# Patient Record
Sex: MALE | Race: WHITE | Employment: FULL TIME | URBAN - METROPOLITAN AREA
[De-identification: names, ages, dates, MRNs, and addresses within clinical notes are randomized per-mention and may not be internally consistent; named-entity substitution may affect disease eponyms.]

---

## 2017-01-24 ENCOUNTER — LAB ENCOUNTER (OUTPATIENT)
Dept: LAB | Age: 57
End: 2017-01-24
Attending: INTERNAL MEDICINE
Payer: COMMERCIAL

## 2017-01-24 DIAGNOSIS — E78.00 PURE HYPERCHOLESTEROLEMIA: ICD-10-CM

## 2017-01-24 DIAGNOSIS — F41.9 ANXIETY: ICD-10-CM

## 2017-01-24 LAB
ALBUMIN SERPL-MCNC: 4.1 G/DL (ref 3.5–4.8)
ALP LIVER SERPL-CCNC: 73 U/L (ref 45–117)
ALT SERPL-CCNC: 49 U/L (ref 17–63)
AST SERPL-CCNC: 18 U/L (ref 15–41)
BASOPHILS # BLD AUTO: 0.05 X10(3) UL (ref 0–0.1)
BASOPHILS NFR BLD AUTO: 0.9 %
BILIRUB SERPL-MCNC: 1 MG/DL (ref 0.1–2)
BILIRUB UR QL STRIP.AUTO: NEGATIVE
BUN BLD-MCNC: 10 MG/DL (ref 8–20)
CALCIUM BLD-MCNC: 9 MG/DL (ref 8.3–10.3)
CHLORIDE: 108 MMOL/L (ref 101–111)
CLARITY UR REFRACT.AUTO: CLEAR
CO2: 25 MMOL/L (ref 22–32)
COLOR UR AUTO: YELLOW
CREAT BLD-MCNC: 0.89 MG/DL (ref 0.7–1.3)
EOSINOPHIL # BLD AUTO: 0.1 X10(3) UL (ref 0–0.3)
EOSINOPHIL NFR BLD AUTO: 1.8 %
ERYTHROCYTE [DISTWIDTH] IN BLOOD BY AUTOMATED COUNT: 12.8 % (ref 11.5–16)
GLUCOSE BLD-MCNC: 102 MG/DL (ref 70–99)
GLUCOSE UR STRIP.AUTO-MCNC: NEGATIVE MG/DL
HCT VFR BLD AUTO: 41.7 % (ref 37–53)
HGB BLD-MCNC: 14.1 G/DL (ref 13–17)
IMMATURE GRANULOCYTE COUNT: 0.03 X10(3) UL (ref 0–1)
IMMATURE GRANULOCYTE RATIO %: 0.5 %
KETONES UR STRIP.AUTO-MCNC: NEGATIVE MG/DL
LEUKOCYTE ESTERASE UR QL STRIP.AUTO: NEGATIVE
LYMPHOCYTES # BLD AUTO: 1.9 X10(3) UL (ref 0.9–4)
LYMPHOCYTES NFR BLD AUTO: 33.5 %
M PROTEIN MFR SERPL ELPH: 7 G/DL (ref 6.1–8.3)
MCH RBC QN AUTO: 30.3 PG (ref 27–33.2)
MCHC RBC AUTO-ENTMCNC: 33.8 G/DL (ref 31–37)
MCV RBC AUTO: 89.5 FL (ref 80–99)
MONOCYTES # BLD AUTO: 0.34 X10(3) UL (ref 0.1–0.6)
MONOCYTES NFR BLD AUTO: 6 %
NEUTROPHIL ABS PRELIM: 3.26 X10 (3) UL (ref 1.3–6.7)
NEUTROPHILS # BLD AUTO: 3.26 X10(3) UL (ref 1.3–6.7)
NEUTROPHILS NFR BLD AUTO: 57.3 %
NITRITE UR QL STRIP.AUTO: NEGATIVE
PH UR STRIP.AUTO: 5 [PH] (ref 4.5–8)
PLATELET # BLD AUTO: 195 10(3)UL (ref 150–450)
POTASSIUM SERPL-SCNC: 4.1 MMOL/L (ref 3.6–5.1)
PROT UR STRIP.AUTO-MCNC: NEGATIVE MG/DL
PSA SERPL-MCNC: 0.71 NG/ML (ref 0.01–4)
RBC # BLD AUTO: 4.66 X10(6)UL (ref 4.3–5.7)
RBC UR QL AUTO: NEGATIVE
RED CELL DISTRIBUTION WIDTH-SD: 41.5 FL (ref 35.1–46.3)
SODIUM SERPL-SCNC: 142 MMOL/L (ref 136–144)
SP GR UR STRIP.AUTO: 1.02 (ref 1–1.03)
TSI SER-ACNC: 3.33 MIU/ML (ref 0.35–5.5)
UROBILINOGEN UR STRIP.AUTO-MCNC: <2 MG/DL
WBC # BLD AUTO: 5.7 X10(3) UL (ref 4–13)

## 2017-01-24 PROCEDURE — 81003 URINALYSIS AUTO W/O SCOPE: CPT

## 2017-01-24 PROCEDURE — 81291 MTHFR GENE: CPT

## 2017-01-24 PROCEDURE — 84153 ASSAY OF PSA TOTAL: CPT

## 2017-01-24 PROCEDURE — 85025 COMPLETE CBC W/AUTO DIFF WBC: CPT

## 2017-01-24 PROCEDURE — 36415 COLL VENOUS BLD VENIPUNCTURE: CPT

## 2017-01-24 PROCEDURE — 80053 COMPREHEN METABOLIC PANEL: CPT

## 2017-01-24 PROCEDURE — 84443 ASSAY THYROID STIM HORMONE: CPT

## 2017-02-08 NOTE — TELEPHONE ENCOUNTER
Requesting Escitalopram   LOV: 10/13/16  RTC: 2 weeks   Last Labs: n/a   Filled: 1/25/16 #90 with 3 refills    Future Appointments  Date Time Provider Savannah Strong   3/8/2017 10:00 AM Jhonny Ro MD [facility] [address] [facility] [address]       Pended if okay

## 2017-02-09 RX ORDER — ESCITALOPRAM OXALATE 10 MG/1
TABLET ORAL
Qty: 90 TABLET | Refills: 0 | Status: SHIPPED | OUTPATIENT
Start: 2017-02-09 | End: 2017-03-08

## 2017-03-01 ENCOUNTER — MED REC SCAN ONLY (OUTPATIENT)
Dept: FAMILY MEDICINE CLINIC | Facility: CLINIC | Age: 57
End: 2017-03-01

## 2017-03-08 ENCOUNTER — LAB ENCOUNTER (OUTPATIENT)
Dept: LAB | Age: 57
End: 2017-03-08
Attending: INTERNAL MEDICINE
Payer: COMMERCIAL

## 2017-03-08 ENCOUNTER — OFFICE VISIT (OUTPATIENT)
Dept: FAMILY MEDICINE CLINIC | Facility: CLINIC | Age: 57
End: 2017-03-08

## 2017-03-08 VITALS
WEIGHT: 253 LBS | TEMPERATURE: 98 F | HEART RATE: 82 BPM | DIASTOLIC BLOOD PRESSURE: 84 MMHG | RESPIRATION RATE: 16 BRPM | HEIGHT: 73 IN | BODY MASS INDEX: 33.53 KG/M2 | SYSTOLIC BLOOD PRESSURE: 124 MMHG

## 2017-03-08 DIAGNOSIS — E78.00 PURE HYPERCHOLESTEROLEMIA: Primary | ICD-10-CM

## 2017-03-08 DIAGNOSIS — Z15.89 HETEROZYGOUS MTHFR MUTATION C677T: Primary | ICD-10-CM

## 2017-03-08 DIAGNOSIS — R73.03 PREDIABETES: ICD-10-CM

## 2017-03-08 DIAGNOSIS — F41.9 ANXIETY: ICD-10-CM

## 2017-03-08 PROCEDURE — 99213 OFFICE O/P EST LOW 20 MIN: CPT | Performed by: INTERNAL MEDICINE

## 2017-03-08 RX ORDER — ESCITALOPRAM OXALATE 10 MG/1
10 TABLET ORAL
Qty: 90 TABLET | Refills: 3 | Status: SHIPPED | OUTPATIENT
Start: 2017-03-08 | End: 2020-03-02 | Stop reason: ALTCHOICE

## 2017-03-08 RX ORDER — LEVOMEFOLATE CALCIUM 15 MG
15 TABLET ORAL DAILY
Qty: 30 TABLET | Refills: 11 | Status: SHIPPED | COMMUNITY
Start: 2017-03-08 | End: 2017-04-07

## 2017-03-08 NOTE — PROGRESS NOTES
CC: Patient presents with:  Lab Results: pt declines flu shot today       HPI:   1. Anxiety, doing well on lexapro, needs refill     Also here to discuss labs.        Current Outpatient Prescriptions:  L-Methylfolate 15 MG Oral Tab Take 1 tablet (15 m Wt 253 lb  BMI 33.39 kg/m2  GENERAL: well nourished and in no apparent distress, A/O x3  HEENT:  PERRLA  LUNGS: clear to auscultation bilat   CARDIO: RRR without murmur      Orders Placed This Encounter  Flu Fluarix QUADrivalent >=3 yrs SINGLE dose Preserv

## 2017-03-16 ENCOUNTER — TELEPHONE (OUTPATIENT)
Dept: FAMILY MEDICINE CLINIC | Facility: CLINIC | Age: 57
End: 2017-03-16

## 2017-03-20 NOTE — TELEPHONE ENCOUNTER
Elevated lipids. OV in 2-3 months. Total Chol - 249  Trig - 571  Future Appointments  Date Time Provider Savannah Ligia   9/11/2017 9:00 AM Chio Crowley MD [facility] [address] [facility] [address]     Copy mailed to patient.

## 2017-03-22 NOTE — TELEPHONE ENCOUNTER
Patient notified of lab results. There are no available appointments in 3 months. Patient informed will place on waiting list for an appointment.

## 2017-09-11 ENCOUNTER — OFFICE VISIT (OUTPATIENT)
Dept: FAMILY MEDICINE CLINIC | Facility: CLINIC | Age: 57
End: 2017-09-11

## 2017-09-11 VITALS
SYSTOLIC BLOOD PRESSURE: 135 MMHG | DIASTOLIC BLOOD PRESSURE: 85 MMHG | WEIGHT: 253 LBS | HEIGHT: 73 IN | BODY MASS INDEX: 33.53 KG/M2 | RESPIRATION RATE: 16 BRPM | HEART RATE: 96 BPM

## 2017-09-11 DIAGNOSIS — Z12.5 SCREENING PSA (PROSTATE SPECIFIC ANTIGEN): ICD-10-CM

## 2017-09-11 DIAGNOSIS — Z15.89 HETEROZYGOUS MTHFR MUTATION C677T: ICD-10-CM

## 2017-09-11 DIAGNOSIS — F41.9 ANXIETY: ICD-10-CM

## 2017-09-11 DIAGNOSIS — E78.00 PURE HYPERCHOLESTEROLEMIA: Primary | ICD-10-CM

## 2017-09-11 PROCEDURE — 99214 OFFICE O/P EST MOD 30 MIN: CPT | Performed by: INTERNAL MEDICINE

## 2017-09-11 RX ORDER — LEVOMEFOLATE CALCIUM 15 MG
TABLET ORAL
Refills: 11 | COMMUNITY
Start: 2017-07-05 | End: 2018-01-15

## 2017-09-11 RX ORDER — ROSUVASTATIN CALCIUM 20 MG/1
20 TABLET, COATED ORAL NIGHTLY
Qty: 90 TABLET | Refills: 3 | Status: SHIPPED | OUTPATIENT
Start: 2017-09-11

## 2017-09-11 NOTE — PROGRESS NOTES
CC: Patient presents with:  Lab Results  Medication Follow-Up       HPI:   1. Pure hypercholesterolemia  (primary encounter diagnosis), been trying to watch diet, with family hx of heart dz  2.  [facility]ozygous [facility] doing well on L-m Anemia     Heterozygous MTHFR mutation C677T ([email or web address]        REVIEW OF SYSTEMS:   GENERAL HEALTH: feels well otherwise, denied any fevers chills or night sweats   RESPIRATORY: denies shortness of breath   CARDIOVASCULAR: denies chest pain    EXAM:   /85

## 2017-12-27 ENCOUNTER — LAB ENCOUNTER (OUTPATIENT)
Dept: LAB | Age: 57
End: 2017-12-27
Attending: INTERNAL MEDICINE
Payer: COMMERCIAL

## 2017-12-27 DIAGNOSIS — R73.09 ELEVATED GLUCOSE: ICD-10-CM

## 2017-12-27 DIAGNOSIS — Z12.5 SCREENING PSA (PROSTATE SPECIFIC ANTIGEN): ICD-10-CM

## 2017-12-27 DIAGNOSIS — E78.00 PURE HYPERCHOLESTEROLEMIA: ICD-10-CM

## 2017-12-27 PROCEDURE — 83036 HEMOGLOBIN GLYCOSYLATED A1C: CPT | Performed by: FAMILY MEDICINE

## 2018-03-28 RX ORDER — ESCITALOPRAM OXALATE 10 MG/1
TABLET ORAL
Qty: 90 TABLET | Refills: 0 | OUTPATIENT
Start: 2018-03-28

## 2018-04-16 PROBLEM — K60.2 FISSURE IN ANO: Status: ACTIVE | Noted: 2018-04-16

## 2023-06-16 ENCOUNTER — OFFICE VISIT (OUTPATIENT)
Dept: FAMILY MEDICINE CLINIC | Facility: CLINIC | Age: 63
End: 2023-06-16
Payer: COMMERCIAL

## 2023-06-16 VITALS
RESPIRATION RATE: 16 BRPM | HEART RATE: 82 BPM | BODY MASS INDEX: 31.95 KG/M2 | WEIGHT: 249 LBS | DIASTOLIC BLOOD PRESSURE: 74 MMHG | SYSTOLIC BLOOD PRESSURE: 144 MMHG | HEIGHT: 74 IN | OXYGEN SATURATION: 97 %

## 2023-06-16 DIAGNOSIS — I10 PRIMARY HYPERTENSION: Primary | ICD-10-CM

## 2023-06-16 DIAGNOSIS — Z00.00 LABORATORY EXAMINATION ORDERED AS PART OF A ROUTINE GENERAL MEDICAL EXAMINATION: ICD-10-CM

## 2023-06-16 DIAGNOSIS — Z12.11 SCREENING FOR COLON CANCER: ICD-10-CM

## 2023-06-16 DIAGNOSIS — R53.83 OTHER FATIGUE: ICD-10-CM

## 2023-06-16 DIAGNOSIS — E78.00 PURE HYPERCHOLESTEROLEMIA: ICD-10-CM

## 2023-06-16 DIAGNOSIS — Z12.5 ENCOUNTER FOR PROSTATE CANCER SCREENING: ICD-10-CM

## 2023-06-16 DIAGNOSIS — Z87.898 HISTORY OF PREDIABETES: ICD-10-CM

## 2023-06-16 DIAGNOSIS — K21.9 GASTROESOPHAGEAL REFLUX DISEASE, UNSPECIFIED WHETHER ESOPHAGITIS PRESENT: ICD-10-CM

## 2023-06-16 DIAGNOSIS — Z23 NEED FOR SHINGLES VACCINE: ICD-10-CM

## 2023-06-16 PROCEDURE — 3077F SYST BP >= 140 MM HG: CPT | Performed by: FAMILY MEDICINE

## 2023-06-16 PROCEDURE — 90750 HZV VACC RECOMBINANT IM: CPT | Performed by: FAMILY MEDICINE

## 2023-06-16 PROCEDURE — 3008F BODY MASS INDEX DOCD: CPT | Performed by: FAMILY MEDICINE

## 2023-06-16 PROCEDURE — 90471 IMMUNIZATION ADMIN: CPT | Performed by: FAMILY MEDICINE

## 2023-06-16 PROCEDURE — 3078F DIAST BP <80 MM HG: CPT | Performed by: FAMILY MEDICINE

## 2023-06-16 PROCEDURE — 99203 OFFICE O/P NEW LOW 30 MIN: CPT | Performed by: FAMILY MEDICINE

## 2023-06-16 RX ORDER — OMEPRAZOLE 40 MG/1
40 CAPSULE, DELAYED RELEASE ORAL DAILY
Qty: 30 CAPSULE | Refills: 5 | Status: SHIPPED | OUTPATIENT
Start: 2023-06-16 | End: 2023-06-20

## 2023-06-16 RX ORDER — IBUPROFEN 200 MG
400 TABLET ORAL EVERY 6 HOURS PRN
COMMUNITY
End: 2023-06-20

## 2023-06-20 ENCOUNTER — TELEPHONE (OUTPATIENT)
Dept: FAMILY MEDICINE CLINIC | Facility: CLINIC | Age: 63
End: 2023-06-20

## 2023-06-20 RX ORDER — ASPIRIN 81 MG/1
81 TABLET ORAL DAILY
Refills: 0 | COMMUNITY
Start: 2023-06-20

## 2023-06-20 RX ORDER — ATORVASTATIN CALCIUM 80 MG/1
80 TABLET, FILM COATED ORAL NIGHTLY
Refills: 0 | COMMUNITY
Start: 2023-06-20

## 2023-06-20 RX ORDER — LOSARTAN POTASSIUM 50 MG/1
50 TABLET ORAL DAILY
Refills: 0 | COMMUNITY
Start: 2023-06-20

## 2023-06-20 RX ORDER — METOPROLOL TARTRATE 50 MG/1
50 TABLET, FILM COATED ORAL 2 TIMES DAILY
Refills: 0 | COMMUNITY
Start: 2023-06-20

## 2023-06-20 NOTE — TELEPHONE ENCOUNTER
PSR: Please message triage when wife calls back. Called pt's wife and left vm to call office back. Office number provided.    LOV: 06/16/23 as NP

## 2023-06-20 NOTE — TELEPHONE ENCOUNTER
Patient's wife states that the patient had a heart attack on 6/17. Was sent via ambulance to [facility]. Had two stents placed by Dr. Carline Garcia on 6/17/2023. Then had one stent placed yesterday by Dr. Carline Garcia. Total of three stents. Discharged today on the following medications: ASA, atorvastatin, losartan, metoprolol, and Brilinta. (Medication list updated.) Scheduled to see cardiologist, Dr. Carline Garcia, on 6/23/2023. Received 30 day supplies of the five medications when discharged. Patient's wife states that the patient needs a sooner appointment with PCP. (Currently scheduled to see PCP on 7/17/2023.) Additionally, states that PCP will need to manage prescriptions from hospital. Advised patient's wife that cardiologist should manage current medications. Advised patient's wife to address medications with cardiologist on Friday. Patient's wife verbalized understanding. Patient discharged today from hospital post heart attack and stent placement. HFU scheduled 7/17/2023. Requesting sooner appointment. Please advise.

## 2023-06-20 NOTE — TELEPHONE ENCOUNTER
Pt was released from Hospital ( [facility]) today from having a heart attack, pt is out of all his meds and was told he needs to follow up with his pcp. Pt already has appt for 7/17 but he needs to be seen sooner. Pt wife does have medical records ready to bring to appt as well.     LOV:6/16/23

## 2023-06-21 NOTE — TELEPHONE ENCOUNTER
PSR: Please call pt's wife to double book HFU for pt this or next week per Dr. Eldon Babb. Thank you.

## 2023-06-27 ENCOUNTER — LAB ENCOUNTER (OUTPATIENT)
Dept: LAB | Age: 63
End: 2023-06-27
Attending: FAMILY MEDICINE
Payer: COMMERCIAL

## 2023-06-27 DIAGNOSIS — Z87.898 HISTORY OF PREDIABETES: ICD-10-CM

## 2023-06-27 DIAGNOSIS — R53.83 OTHER FATIGUE: ICD-10-CM

## 2023-06-27 DIAGNOSIS — Z00.00 LABORATORY EXAMINATION ORDERED AS PART OF A ROUTINE GENERAL MEDICAL EXAMINATION: ICD-10-CM

## 2023-06-27 DIAGNOSIS — Z12.5 ENCOUNTER FOR PROSTATE CANCER SCREENING: ICD-10-CM

## 2023-06-27 LAB
ALBUMIN SERPL-MCNC: 4.2 G/DL (ref 3.4–5)
ALBUMIN/GLOB SERPL: 1.3 {RATIO} (ref 1–2)
ALP LIVER SERPL-CCNC: 77 U/L
ALT SERPL-CCNC: 49 U/L
ANION GAP SERPL CALC-SCNC: 7 MMOL/L (ref 0–18)
AST SERPL-CCNC: 26 U/L (ref 15–37)
BASOPHILS # BLD AUTO: 0.06 X10(3) UL (ref 0–0.2)
BASOPHILS NFR BLD AUTO: 1 %
BILIRUB SERPL-MCNC: 1.4 MG/DL (ref 0.1–2)
BUN BLD-MCNC: 15 MG/DL (ref 7–18)
CALCIUM BLD-MCNC: 9.7 MG/DL (ref 8.5–10.1)
CHLORIDE SERPL-SCNC: 107 MMOL/L (ref 98–112)
CHOLEST SERPL-MCNC: 85 MG/DL (ref ?–200)
CO2 SERPL-SCNC: 25 MMOL/L (ref 21–32)
COMPLEXED PSA SERPL-MCNC: 0.57 NG/ML (ref ?–4)
CREAT BLD-MCNC: 1.04 MG/DL
EOSINOPHIL # BLD AUTO: 0.17 X10(3) UL (ref 0–0.7)
EOSINOPHIL NFR BLD AUTO: 2.9 %
ERYTHROCYTE [DISTWIDTH] IN BLOOD BY AUTOMATED COUNT: 12.3 %
FASTING PATIENT LIPID ANSWER: YES
FASTING STATUS PATIENT QL REPORTED: YES
GFR SERPLBLD BASED ON 1.73 SQ M-ARVRAT: 82 ML/MIN/1.73M2 (ref 60–?)
GLOBULIN PLAS-MCNC: 3.3 G/DL (ref 2.8–4.4)
GLUCOSE BLD-MCNC: 121 MG/DL (ref 70–99)
HCT VFR BLD AUTO: 43.1 %
HDLC SERPL-MCNC: 44 MG/DL (ref 40–59)
HGB BLD-MCNC: 14 G/DL
IMM GRANULOCYTES # BLD AUTO: 0.03 X10(3) UL (ref 0–1)
IMM GRANULOCYTES NFR BLD: 0.5 %
LDLC SERPL CALC-MCNC: 21 MG/DL (ref ?–100)
LYMPHOCYTES # BLD AUTO: 1.67 X10(3) UL (ref 1–4)
LYMPHOCYTES NFR BLD AUTO: 28.4 %
MCH RBC QN AUTO: 30.3 PG (ref 26–34)
MCHC RBC AUTO-ENTMCNC: 32.5 G/DL (ref 31–37)
MCV RBC AUTO: 93.3 FL
MONOCYTES # BLD AUTO: 0.46 X10(3) UL (ref 0.1–1)
MONOCYTES NFR BLD AUTO: 7.8 %
NEUTROPHILS # BLD AUTO: 3.5 X10 (3) UL (ref 1.5–7.7)
NEUTROPHILS # BLD AUTO: 3.5 X10(3) UL (ref 1.5–7.7)
NEUTROPHILS NFR BLD AUTO: 59.4 %
NONHDLC SERPL-MCNC: 41 MG/DL (ref ?–130)
OSMOLALITY SERPL CALC.SUM OF ELEC: 290 MOSM/KG (ref 275–295)
PLATELET # BLD AUTO: 238 10(3)UL (ref 150–450)
POTASSIUM SERPL-SCNC: 4.3 MMOL/L (ref 3.5–5.1)
PROT SERPL-MCNC: 7.5 G/DL (ref 6.4–8.2)
RBC # BLD AUTO: 4.62 X10(6)UL
SODIUM SERPL-SCNC: 139 MMOL/L (ref 136–145)
TRIGL SERPL-MCNC: 111 MG/DL (ref 30–149)
TSI SER-ACNC: 2.48 MIU/ML (ref 0.36–3.74)
VIT B12 SERPL-MCNC: 647 PG/ML (ref 193–986)
VLDLC SERPL CALC-MCNC: 14 MG/DL (ref 0–30)
WBC # BLD AUTO: 5.9 X10(3) UL (ref 4–11)

## 2023-06-27 PROCEDURE — 82607 VITAMIN B-12: CPT

## 2023-06-27 PROCEDURE — 82306 VITAMIN D 25 HYDROXY: CPT

## 2023-06-27 PROCEDURE — 80061 LIPID PANEL: CPT

## 2023-06-27 PROCEDURE — 84443 ASSAY THYROID STIM HORMONE: CPT

## 2023-06-27 PROCEDURE — 83036 HEMOGLOBIN GLYCOSYLATED A1C: CPT

## 2023-06-27 PROCEDURE — 36415 COLL VENOUS BLD VENIPUNCTURE: CPT

## 2023-06-27 PROCEDURE — 80053 COMPREHEN METABOLIC PANEL: CPT

## 2023-06-27 PROCEDURE — 85025 COMPLETE CBC W/AUTO DIFF WBC: CPT

## 2023-06-28 LAB — VIT D+METAB SERPL-MCNC: 42.4 NG/ML (ref 30–100)

## 2023-06-29 ENCOUNTER — OFFICE VISIT (OUTPATIENT)
Dept: FAMILY MEDICINE CLINIC | Facility: CLINIC | Age: 63
End: 2023-06-29
Payer: COMMERCIAL

## 2023-06-29 VITALS
SYSTOLIC BLOOD PRESSURE: 108 MMHG | OXYGEN SATURATION: 98 % | HEIGHT: 74 IN | WEIGHT: 235 LBS | RESPIRATION RATE: 16 BRPM | HEART RATE: 66 BPM | DIASTOLIC BLOOD PRESSURE: 70 MMHG | BODY MASS INDEX: 30.16 KG/M2

## 2023-06-29 DIAGNOSIS — I25.10 CORONARY ARTERY DISEASE INVOLVING NATIVE CORONARY ARTERY OF NATIVE HEART WITHOUT ANGINA PECTORIS: Primary | ICD-10-CM

## 2023-06-29 DIAGNOSIS — R73.9 HYPERGLYCEMIA: ICD-10-CM

## 2023-06-29 DIAGNOSIS — I10 PRIMARY HYPERTENSION: ICD-10-CM

## 2023-06-29 LAB
EST. AVERAGE GLUCOSE BLD GHB EST-MCNC: 117 MG/DL (ref 68–126)
HBA1C MFR BLD: 5.7 % (ref ?–5.7)

## 2023-06-29 PROCEDURE — 3074F SYST BP LT 130 MM HG: CPT | Performed by: FAMILY MEDICINE

## 2023-06-29 PROCEDURE — 99213 OFFICE O/P EST LOW 20 MIN: CPT | Performed by: FAMILY MEDICINE

## 2023-06-29 PROCEDURE — 3078F DIAST BP <80 MM HG: CPT | Performed by: FAMILY MEDICINE

## 2023-06-29 PROCEDURE — 3008F BODY MASS INDEX DOCD: CPT | Performed by: FAMILY MEDICINE

## 2023-06-29 RX ORDER — CLOPIDOGREL BISULFATE 75 MG/1
75 TABLET ORAL DAILY
COMMUNITY
Start: 2023-06-23

## 2023-07-13 ENCOUNTER — TELEPHONE (OUTPATIENT)
Dept: FAMILY MEDICINE CLINIC | Facility: CLINIC | Age: 63
End: 2023-07-13

## 2023-07-13 NOTE — TELEPHONE ENCOUNTER
Consult report dated 7/12/2023 was received from [facility]. Repot placed in Dr Darron Bowden in-box pending review.

## 2023-09-15 ENCOUNTER — LAB ENCOUNTER (OUTPATIENT)
Dept: LAB | Age: 63
End: 2023-09-15
Attending: FAMILY MEDICINE
Payer: COMMERCIAL

## 2023-09-15 DIAGNOSIS — R73.9 HYPERGLYCEMIA: ICD-10-CM

## 2023-09-15 LAB
EST. AVERAGE GLUCOSE BLD GHB EST-MCNC: 117 MG/DL (ref 68–126)
HBA1C MFR BLD: 5.7 % (ref ?–5.7)

## 2023-09-15 PROCEDURE — 83036 HEMOGLOBIN GLYCOSYLATED A1C: CPT | Performed by: FAMILY MEDICINE

## 2023-10-02 ENCOUNTER — NURSE ONLY (OUTPATIENT)
Dept: FAMILY MEDICINE CLINIC | Facility: CLINIC | Age: 63
End: 2023-10-02
Payer: COMMERCIAL

## 2023-10-02 DIAGNOSIS — Z23 NEED FOR SHINGLES VACCINE: Primary | ICD-10-CM

## 2023-10-02 PROCEDURE — 90750 HZV VACC RECOMBINANT IM: CPT | Performed by: FAMILY MEDICINE

## 2023-10-02 PROCEDURE — 90471 IMMUNIZATION ADMIN: CPT | Performed by: FAMILY MEDICINE

## 2023-10-05 ENCOUNTER — PATIENT MESSAGE (OUTPATIENT)
Dept: FAMILY MEDICINE CLINIC | Facility: CLINIC | Age: 63
End: 2023-10-05

## 2023-10-05 NOTE — TELEPHONE ENCOUNTER
From: Brandon Anderson  To: Shelby Giles  Sent: 10/5/2023 1:04 PM CDT  Subject: Blood work    Do you want me to get blood work done before next visit. If so can you put order in. It will almost be 2 months before I had it done before our next visit.

## 2023-10-11 ENCOUNTER — OFFICE VISIT (OUTPATIENT)
Dept: FAMILY MEDICINE CLINIC | Facility: CLINIC | Age: 63
End: 2023-10-11
Payer: COMMERCIAL

## 2023-10-11 VITALS
DIASTOLIC BLOOD PRESSURE: 66 MMHG | BODY MASS INDEX: 27.21 KG/M2 | OXYGEN SATURATION: 97 % | RESPIRATION RATE: 16 BRPM | HEIGHT: 74 IN | HEART RATE: 68 BPM | SYSTOLIC BLOOD PRESSURE: 106 MMHG | WEIGHT: 212 LBS

## 2023-10-11 DIAGNOSIS — Z23 NEED FOR VACCINATION: ICD-10-CM

## 2023-10-11 DIAGNOSIS — I10 PRIMARY HYPERTENSION: ICD-10-CM

## 2023-10-11 DIAGNOSIS — E78.00 PURE HYPERCHOLESTEROLEMIA: ICD-10-CM

## 2023-10-11 DIAGNOSIS — I25.10 CORONARY ARTERY DISEASE INVOLVING NATIVE CORONARY ARTERY OF NATIVE HEART WITHOUT ANGINA PECTORIS: Primary | ICD-10-CM

## 2023-10-11 PROCEDURE — 3008F BODY MASS INDEX DOCD: CPT | Performed by: FAMILY MEDICINE

## 2023-10-11 PROCEDURE — 3074F SYST BP LT 130 MM HG: CPT | Performed by: FAMILY MEDICINE

## 2023-10-11 PROCEDURE — 99213 OFFICE O/P EST LOW 20 MIN: CPT | Performed by: FAMILY MEDICINE

## 2023-10-11 PROCEDURE — 90471 IMMUNIZATION ADMIN: CPT | Performed by: FAMILY MEDICINE

## 2023-10-11 PROCEDURE — 3078F DIAST BP <80 MM HG: CPT | Performed by: FAMILY MEDICINE

## 2023-10-11 PROCEDURE — 90686 IIV4 VACC NO PRSV 0.5 ML IM: CPT | Performed by: FAMILY MEDICINE

## 2023-10-11 RX ORDER — LOSARTAN POTASSIUM 25 MG/1
25 TABLET ORAL DAILY
COMMUNITY
Start: 2023-09-22

## 2023-10-11 RX ORDER — ATORVASTATIN CALCIUM 20 MG/1
20 TABLET, FILM COATED ORAL DAILY
COMMUNITY
Start: 2023-09-20

## 2024-03-04 ENCOUNTER — TELEMEDICINE (OUTPATIENT)
Dept: FAMILY MEDICINE CLINIC | Facility: CLINIC | Age: 64
End: 2024-03-04
Payer: COMMERCIAL

## 2024-03-04 ENCOUNTER — TELEPHONE (OUTPATIENT)
Dept: FAMILY MEDICINE CLINIC | Facility: CLINIC | Age: 64
End: 2024-03-04

## 2024-03-04 ENCOUNTER — PATIENT MESSAGE (OUTPATIENT)
Dept: FAMILY MEDICINE CLINIC | Facility: CLINIC | Age: 64
End: 2024-03-04

## 2024-03-04 DIAGNOSIS — M54.9 UPPER BACK PAIN: Primary | ICD-10-CM

## 2024-03-04 DIAGNOSIS — G47.00 INSOMNIA, UNSPECIFIED TYPE: ICD-10-CM

## 2024-03-04 RX ORDER — DIAZEPAM 5 MG/1
5 TABLET ORAL EVERY 12 HOURS PRN
Qty: 60 TABLET | Refills: 0 | Status: SHIPPED | OUTPATIENT
Start: 2024-03-04

## 2024-03-04 NOTE — TELEPHONE ENCOUNTER
Flag on file, Jose called to verify prescription for   diazePAM 5 MG Oral Tab [[id number]]      [id number] - [place], [place] - [address] AT [facility] [address], [phone], [phone]

## 2024-03-04 NOTE — TELEPHONE ENCOUNTER
From: Sanket Anderson  To: Campbell Giles  Sent: 3/4/2024 3:47 PM CST  Subject: Jose verification     Dr Chan Garcia won’t fill prescription until you verify.

## 2024-03-04 NOTE — TELEPHONE ENCOUNTER
Called Jose, talked with Pharmacist Portia who is verifying if you sent diazepam 5 mg. Please advise. Thank you.

## 2024-03-04 NOTE — PROGRESS NOTES
Virtual Telephone Check-In    Sanket Anderson verbally consents to a Virtual/Telephone Check-In visit on 03/04/24.  Patient has been referred to the [facility] website at [email or web address] to review the yearly Consent to Treat document.    Patient understands and accepts financial responsibility for any deductible, co-insurance and/or co-pays associated with this service.    Duration of the service: 22 minutes      Summary of topics discussed:     HPI:    He was recently switched to pravastatin. He reports recent upper back pain. He did mention this to his cardiologist - he was advised to stop pravastatin and complete CK levels. He has history of fibromyalgia and was previously on many medications for this. He feels this is similar to previous fibromyalgia pain  Causes him difficulty following asleep. He is only able to sleep for a few hours d/t pain. He is requesting medication to help with insomnia. He denies any associated numbness, weakness. Denies any injury. Has been doing stretches which has been helping. Sx during day are manageable, worsens at night. He has been traveling.    He has been trying heat, topical analgesics.       ROS: pertinent positives and negatives as per HPI     PE:   Gen: AAOx3  Head: NC/AT  Eyes: conjunctiva normal  Nose: No discharge visualized   Resp: breathing well on exam, normal effort. Able to speak full sentences without any SOB  Skin: no rashes, no lesions, no cyanosis  Psych: normal affect       Diagnoses and all orders for this visit:    Upper back pain  -     diazePAM 5 MG Oral Tab; Take 1 tablet (5 mg total) by mouth every 12 (twelve) hours as needed for Anxiety or Sleep.    Insomnia, unspecified type      Will trial diazepam - could help as muscle relaxant and anti-anxyiolytic/sleep aid. Advised that he needs follow up OV in clinic for further assessment - he agreed and will scheduled. Reviewed medication administration, side effects. Continue tylenol prn. Continue  heat/ice topical pain analgesics, stretching exercises. F/u 1-2wks.     Campbell Giles MD

## 2024-03-05 NOTE — TELEPHONE ENCOUNTER
Detailed message left on [facility] that Dr. Giles did fill Diazepam Rx, diagnosis & office # to call back with any questions.

## 2024-04-05 ENCOUNTER — LAB ENCOUNTER (OUTPATIENT)
Dept: LAB | Age: 64
End: 2024-04-05
Attending: FAMILY MEDICINE
Payer: COMMERCIAL

## 2024-04-05 ENCOUNTER — OFFICE VISIT (OUTPATIENT)
Dept: FAMILY MEDICINE CLINIC | Facility: CLINIC | Age: 64
End: 2024-04-05
Payer: COMMERCIAL

## 2024-04-05 VITALS
BODY MASS INDEX: 26.56 KG/M2 | DIASTOLIC BLOOD PRESSURE: 64 MMHG | RESPIRATION RATE: 16 BRPM | SYSTOLIC BLOOD PRESSURE: 114 MMHG | HEART RATE: 67 BPM | OXYGEN SATURATION: 97 % | WEIGHT: 207 LBS | HEIGHT: 74 IN

## 2024-04-05 DIAGNOSIS — R73.9 HYPERGLYCEMIA: ICD-10-CM

## 2024-04-05 DIAGNOSIS — Z00.00 LABORATORY EXAMINATION ORDERED AS PART OF A ROUTINE GENERAL MEDICAL EXAMINATION: ICD-10-CM

## 2024-04-05 DIAGNOSIS — M79.7 FIBROMYALGIA: Primary | ICD-10-CM

## 2024-04-05 LAB
EST. AVERAGE GLUCOSE BLD GHB EST-MCNC: 114 MG/DL (ref 68–126)
HBA1C MFR BLD: 5.6 % (ref ?–5.7)
TSI SER-ACNC: 1.69 MIU/ML (ref 0.36–3.74)

## 2024-04-05 PROCEDURE — 3074F SYST BP LT 130 MM HG: CPT | Performed by: FAMILY MEDICINE

## 2024-04-05 PROCEDURE — 84443 ASSAY THYROID STIM HORMONE: CPT | Performed by: FAMILY MEDICINE

## 2024-04-05 PROCEDURE — 3078F DIAST BP <80 MM HG: CPT | Performed by: FAMILY MEDICINE

## 2024-04-05 PROCEDURE — 3008F BODY MASS INDEX DOCD: CPT | Performed by: FAMILY MEDICINE

## 2024-04-05 PROCEDURE — 99214 OFFICE O/P EST MOD 30 MIN: CPT | Performed by: FAMILY MEDICINE

## 2024-04-05 PROCEDURE — 83036 HEMOGLOBIN GLYCOSYLATED A1C: CPT | Performed by: FAMILY MEDICINE

## 2024-04-05 RX ORDER — GABAPENTIN 300 MG/1
300 CAPSULE ORAL NIGHTLY
Qty: 30 CAPSULE | Refills: 2 | Status: SHIPPED | OUTPATIENT
Start: 2024-04-05 | End: 2024-04-26 | Stop reason: ALTCHOICE

## 2024-04-05 RX ORDER — ESCITALOPRAM OXALATE 10 MG/1
TABLET ORAL
Qty: 28 TABLET | Refills: 0 | Status: SHIPPED | OUTPATIENT
Start: 2024-04-05 | End: 2024-04-26

## 2024-04-05 NOTE — PROGRESS NOTES
[facility] Office Note  Chief Complaint:   Chief Complaint   Patient presents with    Follow - Up     Diazepam follow up     Sleep Problem       HPI:   This is a 63 year old male coming in for fibromyalgia follow up.     03/04 OV   He was recently switched to pravastatin. He reports recent upper back pain. He did mention this to his cardiologist - he was advised to stop pravastatin and complete CK levels. He has history of fibromyalgia and was previously on many medications for this. He feels this is similar to previous fibromyalgia pain  Causes him difficulty following asleep. He is only able to sleep for a few hours d/t pain. He is requesting medication to help with insomnia. He denies any associated numbness, weakness. Denies any injury. Has been doing stretches which has been helping. Sx during day are manageable, worsens at night. He has been traveling.    He has been trying heat, topical analgesics.      Interval history   Started on diazepam at last OV d/t symptoms. Reports that it has been helping with anxiety and getting a little more sleep but he reports widespead muscle spasms. Reports localized to back, shoulders, abdomen.     Past Medical History:    Cervical spondylosis    Diverticulosis    Duodenitis    Erosive    Epigastric pain    Fibromyalgia    Gastric diverticulum    Gastritis    Heterozygous MTHFR mutation C677T    HTN (hypertension)    Hyperlipidemia    Hypogonadism male    Insomnia    Irritable bowel syndrome    Lumbago    Screening for iron deficiency anemia    Screening for lipoid disorders    Screening for other and unspecified endocrine, nutritional, metabolic, and immunity disorders    Metabolic Disorders    Screening for thyroid disorder    Special screening for malignant neoplasm of prostate    Thoracic disc herniation     Past Surgical History:   Procedure Laterality Date    Colonoscopy  2015    Fluor gid & loclzj ndl/cath spi dx/ther njx  07/09/2012     Procedure: THORACIC EPIDURAL;  Surgeon: Heron Ramirez MD;  [facility]    Fluor gid & loclzj ndl/cath spi dx/ther njx  07/24/2012    Procedure: TRANSFORAMINAL EPIDURAL - CERVICAL;  Surgeon: Khai Iniguez MD;  [facility]: [facility]    Fluor gid & loclzj ndl/cath spi dx/ther njx  10/23/2014    Procedure: ;  Surgeon: Khai Iniguez MD;  [facility]    Fluor gid & loclzj ndl/cath spi dx/ther njx N/A 11/03/2014    Procedure: CERVICAL EPIDURAL;  Surgeon: Khai Ingiuez MD;  [facility]    Fluor gid & loclzj ndl/cath spi dx/ther njx N/A 11/24/2014    Procedure: CERVICAL EPIDURAL;  Surgeon: Khai Iniguez MD;  [facility]: [facility]    Fluor gid & loclzj ndl/cath spi dx/ther njx N/A 02/27/2015    Procedure: THORACIC EPIDURAL;  Surgeon: Khai Iniguez MD;  Location: [facility]    Fluor gid & loclzj ndl/cath spi dx/ther njx N/A 03/13/2015    Procedure: THORACIC EPIDURAL;  Surgeon: Heron Ramirez MD;  Location: [facility]    Fluor gid & loclzj ndl/cath spi dx/ther njx N/A 03/27/2015    Procedure: THORACIC EPIDURAL;  Surgeon: Khai Iniguez MD;  Location: [facility]    Injection, w/wo contrast, dx/therapeutic substance, epidural/subarachnoid; cervical/thoracic  07/09/2012    Procedure: THORACIC EPIDURAL;  Surgeon: Heron Ramirez MD;  [facility]    Injection, w/wo contrast, dx/therapeutic substance, epidural/subarachnoid; cervical/thoracic  07/24/2012    Procedure: TRANSFORAMINAL EPIDURAL - CERVICAL;  Surgeon: Khai Iniguez MD;  Location: [facility]    Injection, w/wo contrast, dx/therapeutic substance, epidural/subarachnoid; cervical/thoracic N/A 10/23/2014    Procedure: CERVICAL EPIDURAL;  Surgeon: Khai Iniguez MD;  Location: [facility]    Injection, w/wo  contrast, dx/therapeutic substance, epidural/subarachnoid; cervical/thoracic N/A 11/03/2014    Procedure: CERVICAL EPIDURAL;  Surgeon: Khai Iniguez MD;  Location: [facility]    Injection, w/wo contrast, dx/therapeutic substance, epidural/subarachnoid; cervical/thoracic N/A 11/24/2014    Procedure: CERVICAL EPIDURAL;  Surgeon: Khai Iniguez MD;  Location: [facility]    Injection, w/wo contrast, dx/therapeutic substance, epidural/subarachnoid; cervical/thoracic N/A 02/27/2015    Procedure: THORACIC EPIDURAL;  Surgeon: Khai Iniguez MD;  Location: [facility]    Injection, w/wo contrast, dx/therapeutic substance, epidural/subarachnoid; cervical/thoracic N/A 03/13/2015    Procedure: THORACIC EPIDURAL;  Surgeon: Heron Ramirez MD;  Location: [facility]    Injection, w/wo contrast, dx/therapeutic substance, epidural/subarachnoid; cervical/thoracic N/A 03/27/2015    Procedure: THORACIC EPIDURAL;  Surgeon: Khai Iniguez MD;  Location: [facility]    M-sedaj by  phys perfrmg svc 5+ yr  07/09/2012    Procedure: THORACIC EPIDURAL;  Surgeon: Heron Ramirez MD;  Location: [facility]    M-sedaj by  phys perfrmg svc 5+ yr  10/23/2014    Procedure: ;  Surgeon: Khai Iniguez MD;  Location: [facility]    M-sedaj by  phys perfrmg svc 5+ yr N/A 11/03/2014    Procedure: CERVICAL EPIDURAL;  Surgeon: Khai Iniguez MD;  [facility]    M-sedaj by  phys perfrmg svc 5+ yr N/A 11/24/2014    Procedure: CERVICAL EPIDURAL;  Surgeon: Khai Iniguez MD;  Location: [facility]    M-sedaj by  phys perfrmg svc 5+ yr N/A 02/27/2015    Procedure: THORACIC EPIDURAL;  Surgeon: Khai Iniguez MD;  Location: [facility]    M-sedaj by  phys perfrmg svc 5+ yr N/A 03/13/2015    Procedure: THORACIC EPIDURAL;  Surgeon:  Heron Ramirez MD;  [facility]    M-sedaj by moni milligang sv 5+ yr N/A 03/27/2015    Procedure: THORACIC EPIDURAL;  Surgeon: Khai Iniguez MD;  [facility]: [facility]    Upper gi endoscopy,biopsy  05/10/2012     Social History:  Social History     Socioeconomic History    Marital status:    Tobacco Use    Smoking status: Never    Smokeless tobacco: Never   Vaping Use    Vaping status: Never Used   Substance and Sexual Activity    Alcohol use: Yes     Alcohol/week: 6.0 standard drinks of alcohol     Types: 6 Cans of beer per week     Comment: social    Drug use: No   Other Topics Concern    Caffeine Concern Yes    Stress Concern No    Weight Concern Yes    Special Diet No    Exercise Yes    Seat Belt Yes     Family History:  Family History   Problem Relation Age of Onset    Other (Diabetes Mellitus[other]) Mother     Cancer Mother         Lung cancer    Cancer Father         Lung cancer    Heart Disease Father     Other (Gastric ca[other]) Other         fam hx     Allergies:  Allergies   Allergen Reactions    Atorvastatin MYALGIA     Current Meds:  Current Outpatient Medications   Medication Sig Dispense Refill    gabapentin 300 MG Oral Cap Take 1 capsule (300 mg total) by mouth nightly. (Patient not taking: Reported on 4/19/2024) 30 capsule 2    escitalopram (LEXAPRO) 10 MG Oral Tab Take 1 tablet (10 mg total) by mouth daily for 14 days, THEN 1 tablet (10 mg total) daily for 14 days. (Patient not taking: Reported on 4/19/2024) 28 tablet 0    diazePAM 5 MG Oral Tab Take 1 tablet (5 mg total) by mouth every 12 (twelve) hours as needed for Anxiety or Sleep. (Patient not taking: Reported on 4/19/2024) 60 tablet 0    losartan 25 MG Oral Tab Take 1 tablet (25 mg total) by mouth daily.      metoprolol tartrate 25 MG Oral Tab Take 1 tablet (25 mg total) by mouth 2 (two) times daily.      clopidogrel 75 MG Oral Tab Take 1 tablet (75 mg total) by mouth daily.       aspirin (ASPIRIN 81) 81 MG Oral Tab EC Take 1 tablet (81 mg total) by mouth daily.  0    pregabalin 75 MG Oral Cap Take 1 capsule (75 mg total) by mouth 2 (two) times daily as needed (pain). 60 capsule 0    methocarbamol 750 MG Oral Tab Take 1 tablet (750 mg total) by mouth nightly.      methylPREDNISolone 4 MG Oral Tablet Therapy Pack Take 1 tablet (4 mg total) by mouth As Directed.      tiZANidine HCl 4 MG Oral Cap Take 1 capsule (4 mg total) by mouth 3 (three) times daily. (Patient not taking: Reported on 4/19/2024)      traZODone 50 MG Oral Tab Take 1 tablet (50 mg total) by mouth nightly. (Patient not taking: Reported on 4/19/2024)        Counseling given: Not Answered       REVIEW OF SYSTEMS:   Review of Systems   Constitutional: Negative.    Respiratory: Negative.     Cardiovascular: Negative.    Gastrointestinal: Negative.    Musculoskeletal:  Positive for arthralgias, myalgias and neck pain.   Skin: Negative.    Neurological: Negative.    Psychiatric/Behavioral:  The patient is nervous/anxious.         EXAM:   /64   Pulse 67   Resp 16   Ht 6' 2\" (1.88 m)   Wt 207 lb (93.9 kg)   SpO2 97%   BMI 26.58 kg/m²  Estimated body mass index is 26.58 kg/m² as calculated from the following:    Height as of this encounter: 6' 2\" (1.88 m).    Weight as of this encounter: 207 lb (93.9 kg).   Vital signs reviewed.Appears stated age, well groomed.  Physical Exam  Constitutional:       Appearance: Normal appearance.   HENT:      Head: Normocephalic and atraumatic.   Eyes:      Pupils: Pupils are equal, round, and reactive to light.   Cardiovascular:      Rate and Rhythm: Normal rate and regular rhythm.      Pulses: Normal pulses.      Heart sounds: Normal heart sounds. No murmur heard.  Pulmonary:      Effort: Pulmonary effort is normal. No respiratory distress.      Breath sounds: Normal breath sounds. No wheezing or rhonchi.   Abdominal:      General: Abdomen is flat. Bowel sounds are normal. There is no  distension.      Palpations: Abdomen is soft. There is no mass.      Tenderness: There is no abdominal tenderness.      Hernia: No hernia is present.   Musculoskeletal:         General: Tenderness (widespread muscle tenderness especially pronounced at trap/upper back region) present.      Right lower leg: No edema.      Left lower leg: No edema.   Skin:     Findings: No rash.   Neurological:      General: No focal deficit present.      Mental Status: He is alert and oriented to person, place, and time.   Psychiatric:         Mood and Affect: Mood is anxious.          ASSESSMENT AND PLAN:   1. Fibromyalgia  Remains symptomatic w/ fibromyalgia, anxiety. Will trial gabapentin. Reviewed medication administration, side effects. Will also trial lexapro for anxiety. Will refer to rheumatology and integrative clinic. Reviewed return and call back precautions. F/u 1mo or sooner prn.     - [facility] ([facility]) - INTERNAL REF  - Rheumatology Referral - In Network  - gabapentin 300 MG Oral Cap; Take 1 capsule (300 mg total) by mouth nightly. (Patient not taking: Reported on 4/19/2024)  Dispense: 30 capsule; Refill: 2  - escitalopram (LEXAPRO) 10 MG Oral Tab; Take 1 tablet (10 mg total) by mouth daily for 14 days, THEN 1 tablet (10 mg total) daily for 14 days. (Patient not taking: Reported on 4/19/2024)  Dispense: 28 tablet; Refill: 0    2. Hyperglycemia  Check A1c.   - Hemoglobin A1C; Future    3. Laboratory examination ordered as part of a routine general medical examination  - TSH W Reflex To Free T4; Future      Meds & Refills for this Visit:  Requested Prescriptions     Signed Prescriptions Disp Refills    gabapentin 300 MG Oral Cap 30 capsule 2     Sig: Take 1 capsule (300 mg total) by mouth nightly.     Patient not taking: Reported on 4/19/2024    escitalopram (LEXAPRO) 10 MG Oral Tab 28 tablet 0     Sig: Take 1 tablet (10 mg total) by mouth daily for 14 days, THEN 1 tablet (10  mg total) daily for 14 days.     Patient not taking: Reported on 4/19/2024       Health Maintenance:  Health Maintenance Due   Topic Date Due    Annual Physical  Never done    Pneumococcal Vaccine: Birth to 65yrs (1 of 2 - PCV) Never done    Colorectal Cancer Screening  01/01/2022    COVID-19 Vaccine (4 - 2023-24 season) 09/01/2023    Annual Depression Screening  01/01/2024       Patient/Caregiver Education: Patient/Caregiver Education: There are no barriers to learning. Medical education done.   Outcome: Patient verbalizes understanding. Patient is notified to call with any questions, complications, allergies, or worsening or changing symptoms.  Patient is to call with any side effects or complications from the treatments as a result of today.     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    Insomnia    Hypogonadism male    Gastric ulcer, unspecified as acute or chronic, without mention of hemorrhage, perforation, or obstruction    Abdominal tenderness, unspecified site    Lumbago    Irritable bowel syndrome    Thoracic disc herniation    Low back pain    Back pain    HTN (hypertension)    Anxiety    Winging of scapula    Cervical spondylosis    Neck pain    Myalgia and myositis    Muscle spasm of back    Elevated LFTs    Hyperlipidemia    Muscle spasm    Fatty liver    Prediabetes    Bulge of thoracic disc without myelopathy    Thoracic back pain    Cough    Abnormal thyroid blood test    Anemia    Heterozygous MTHFR mutation C677T    Fissure in ano

## 2024-04-10 ENCOUNTER — PATIENT MESSAGE (OUTPATIENT)
Dept: FAMILY MEDICINE CLINIC | Facility: CLINIC | Age: 64
End: 2024-04-10

## 2024-04-10 NOTE — TELEPHONE ENCOUNTER
From: Sanket Anderson  To: Campbell Giels  Sent: 4/10/2024 2:53 PM CDT  Subject: Side effects on gabapentin and lexapro    I’m having some side effects since starting new meds lexapro and gabapentin. Is it ok to discontinue cold turkey after 5 days of taking meds or should I taper them down. Having burning and tingling down arms and legs and high anxiety during the day at times. It helped with sleep but the side effects during the day are not tolerable.

## 2024-04-10 NOTE — TELEPHONE ENCOUNTER
Ok to stop if he can't tolerate. Does he know which one has been contributing to those symptoms?   Anxiety can be higher initially when starting medication but can improve w/ time.   I would recommend to give it a few more days and reevaluate. IF he wants to try one at a time I would recommend staying on lexapro.

## 2024-04-19 ENCOUNTER — OFFICE VISIT (OUTPATIENT)
Dept: INTEGRATIVE MEDICINE | Facility: CLINIC | Age: 64
End: 2024-04-19
Payer: COMMERCIAL

## 2024-04-19 ENCOUNTER — LAB ENCOUNTER (OUTPATIENT)
Dept: LAB | Age: 64
End: 2024-04-19
Attending: PHYSICIAN ASSISTANT
Payer: COMMERCIAL

## 2024-04-19 VITALS
SYSTOLIC BLOOD PRESSURE: 132 MMHG | BODY MASS INDEX: 26 KG/M2 | HEART RATE: 67 BPM | DIASTOLIC BLOOD PRESSURE: 84 MMHG | OXYGEN SATURATION: 96 % | WEIGHT: 202.63 LBS | HEIGHT: 74 IN

## 2024-04-19 DIAGNOSIS — G89.29 OTHER CHRONIC PAIN: Primary | ICD-10-CM

## 2024-04-19 DIAGNOSIS — R53.83 OTHER FATIGUE: ICD-10-CM

## 2024-04-19 DIAGNOSIS — R10.9 ABDOMINAL PAIN, UNSPECIFIED ABDOMINAL LOCATION: ICD-10-CM

## 2024-04-19 DIAGNOSIS — F43.9 STRESS: ICD-10-CM

## 2024-04-19 DIAGNOSIS — G89.29 OTHER CHRONIC PAIN: ICD-10-CM

## 2024-04-19 DIAGNOSIS — G47.00 INSOMNIA, UNSPECIFIED TYPE: ICD-10-CM

## 2024-04-19 LAB
ERYTHROCYTE [SEDIMENTATION RATE] IN BLOOD: 3 MM/HR
FOLATE SERPL-MCNC: 6.3 NG/ML (ref 8.7–?)
THYROGLOB SERPL-MCNC: <15 U/ML (ref ?–60)
THYROPEROXIDASE AB SERPL-ACNC: 33 U/ML (ref ?–60)

## 2024-04-19 PROCEDURE — 3075F SYST BP GE 130 - 139MM HG: CPT | Performed by: PHYSICIAN ASSISTANT

## 2024-04-19 PROCEDURE — 86141 C-REACTIVE PROTEIN HS: CPT

## 2024-04-19 PROCEDURE — 83735 ASSAY OF MAGNESIUM: CPT

## 2024-04-19 PROCEDURE — 84481 FREE ASSAY (FT-3): CPT

## 2024-04-19 PROCEDURE — 86038 ANTINUCLEAR ANTIBODIES: CPT

## 2024-04-19 PROCEDURE — 82525 ASSAY OF COPPER: CPT

## 2024-04-19 PROCEDURE — 84439 ASSAY OF FREE THYROXINE: CPT

## 2024-04-19 PROCEDURE — 99205 OFFICE O/P NEW HI 60 MIN: CPT | Performed by: PHYSICIAN ASSISTANT

## 2024-04-19 PROCEDURE — 3079F DIAST BP 80-89 MM HG: CPT | Performed by: PHYSICIAN ASSISTANT

## 2024-04-19 PROCEDURE — 86376 MICROSOMAL ANTIBODY EACH: CPT

## 2024-04-19 PROCEDURE — 3008F BODY MASS INDEX DOCD: CPT | Performed by: PHYSICIAN ASSISTANT

## 2024-04-19 PROCEDURE — 84630 ASSAY OF ZINC: CPT

## 2024-04-19 PROCEDURE — 84482 T3 REVERSE: CPT

## 2024-04-19 PROCEDURE — 84255 ASSAY OF SELENIUM: CPT

## 2024-04-19 PROCEDURE — 82746 ASSAY OF FOLIC ACID SERUM: CPT

## 2024-04-19 PROCEDURE — 84443 ASSAY THYROID STIM HORMONE: CPT

## 2024-04-19 PROCEDURE — 86800 THYROGLOBULIN ANTIBODY: CPT

## 2024-04-19 PROCEDURE — 86225 DNA ANTIBODY NATIVE: CPT

## 2024-04-19 PROCEDURE — 84410 TESTOSTERONE BIOAVAILABLE: CPT

## 2024-04-19 PROCEDURE — 85652 RBC SED RATE AUTOMATED: CPT

## 2024-04-19 PROCEDURE — 84207 ASSAY OF VITAMIN B-6: CPT

## 2024-04-19 RX ORDER — METHYLPREDNISOLONE 4 MG/1
1 TABLET ORAL AS DIRECTED
COMMUNITY
Start: 2024-04-15

## 2024-04-19 RX ORDER — TIZANIDINE HYDROCHLORIDE 4 MG/1
1 CAPSULE, GELATIN COATED ORAL 3 TIMES DAILY
COMMUNITY
Start: 2024-04-12

## 2024-04-19 RX ORDER — TRAZODONE HYDROCHLORIDE 50 MG/1
50 TABLET ORAL NIGHTLY
COMMUNITY
Start: 2024-04-12

## 2024-04-19 RX ORDER — METHOCARBAMOL 750 MG/1
750 TABLET, FILM COATED ORAL NIGHTLY
COMMUNITY
Start: 2024-04-15

## 2024-04-19 NOTE — PROGRESS NOTES
Sanket Anderson is a 63 year old male.  Chief Complaint   Patient presents with    [facility]     Muscle, insomnia        HPI:   Sanket presents for initial evaluation,     Main concern: 2012 he had chronic pain and was on a lot of medication. He went back to work and was able to get off the medication. He still had pain and he was getting sleep. He has so much anxiety associated with the pain.   He was on antidepressants, lyrica and opiods.     She was given gabapentin his anxiety was spiking.     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     Thyroid: TSH has fluctuated over the last few years 2020 over 3. He has really cold feet     Body/rash: He was walking 3-4 miles a day. He started having pain in his hips and muscles. He switched and went on a new statin. The week between the two statins he feels the anxiety started to snowball   He was taken off statin     Stents placed in June     Back pain he used to take ibuprofen. He is on blood thinners and he feels his crutch was taken away     T8-T9 injury. He did OMT therapy and marc therapy last week both therapies are to release muscle tension     Hormones - He had low testosterone back in 2012. His muscles are losing     Last PSA 2023     GI - He has more softer stools. Not diarrhea. No constipated. NO bloating. He has pain under the rib cage it will go below the belly button. It feels more like it is on the muscles not the digestive tract. He used to have acid reflux. He used to be on prilosec     - Increased urination it is worse when he has anxiety - he feels he is more aware of his body     Mental state - He retired a year and a half ago. He has a part time job. He had snowball anxiety. He now has more muscle pain and not as much anxiety     He has always been a worrier     He has a hard time meditating with the pain.     He has been through  therapy when his fathers death.     Weight History: His weight goes up and down from 250-200. In 2012 he lost weight from 240-180. He noticed the belly and muscle pain     Childhood -   Trauma: He has worried about death and a sense of loss since her was 4-5. Thinking about grandparents and parents.     Parents got  when he was age 16. From age [age] his father was the rock. He lived with his father   Antibiotic use  He was not sick a lot as a child. He does not recall being on it regular     Pertinent medical history:   Heart attack in June with three stents     Pertinent Family history:   Father passed at age 45 - lung cancer   Sister - breast cancer passed   Mother 7-8 years ago. Lung cancer       Lifestyle Factors affecting health:   Diet - he is on a heart healthy diet. He is not gluten free. He is having almond milk     Exercise -He does a lot of walking      Stress - He does not have a lot of stress. He was in [place] for 3 months. He felt good until the last month. He feels he has this sense of loss in the back of his head.     He has been trying to meditate the last few months.     Sleep - He feels he is waiting for pain in the middle of the night. He will have racing thoughts.     Supplements: He is not on supplements     Mold - no known exposure, no environmental allergies   Occupation - [employer].   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      methylPREDNISolone 4 MG Oral Tablet Therapy Pack Take 1 tablet (4 mg total) by mouth As Directed.      losartan 25 MG Oral Tab Take 1 tablet (25 mg total) by mouth daily.      metoprolol tartrate 25 MG Oral Tab Take 1  tablet (25 mg total) by mouth 2 (two) times daily.      clopidogrel 75 MG Oral Tab Take 1 tablet (75 mg total) by mouth daily.      aspirin (ASPIRIN 81) 81 MG Oral Tab EC Take 1 tablet (81 mg total) by mouth daily.  0    tiZANidine HCl 4 MG Oral Cap Take 1 capsule (4 mg total) by mouth 3 (three) times daily. (Patient not taking: Reported on 4/19/2024)      traZODone 50 MG Oral Tab Take 1 tablet (50 mg total) by mouth nightly. (Patient not taking: Reported on 4/19/2024)      gabapentin 300 MG Oral Cap Take 1 capsule (300 mg total) by mouth nightly. (Patient not taking: Reported on 4/19/2024) 30 capsule 2    escitalopram (LEXAPRO) 10 MG Oral Tab Take 1 tablet (10 mg total) by mouth daily for 14 days, THEN 1 tablet (10 mg total) daily for 14 days. (Patient not taking: Reported on 4/19/2024) 28 tablet 0    diazePAM 5 MG Oral Tab Take 1 tablet (5 mg total) by mouth every 12 (twelve) hours as needed for Anxiety or Sleep. (Patient not taking: Reported on 4/19/2024) 60 tablet 0       MEDICAL HISTORY:     Past Medical History:    Cervical spondylosis    Diverticulosis    Duodenitis    Erosive    Epigastric pain    Fibromyalgia    Gastric diverticulum    Gastritis    Heterozygous MTHFR mutation C677T    HTN (hypertension)    Hyperlipidemia    Hypogonadism male    Insomnia    Irritable bowel syndrome    Lumbago    Screening for iron deficiency anemia    Screening for lipoid disorders    Screening for other and unspecified endocrine, nutritional, metabolic, and immunity disorders    Metabolic Disorders    Screening for thyroid disorder    Special screening for malignant neoplasm of prostate    Thoracic disc herniation       SURGICAL HISTORY:     Past Surgical History:   Procedure Laterality Date    Colonoscopy  2015    Fluor gid & loclzj ndl/cath spi dx/ther njx  07/09/2012    Procedure: THORACIC EPIDURAL;  Surgeon: Heron Ramirez MD;  [place] [facility]    Fluor gid & loclzj ndl/cath spi dx/ther njx   07/24/2012    Procedure: TRANSFORAMINAL EPIDURAL - CERVICAL;  Surgeon: Khai Iniguez MD;  [facility]    Fluor gid & loclzj ndl/cath spi dx/ther njx  10/23/2014    Procedure: ;  Surgeon: Khai Iniguez MD;  [facility]    Fluor gid & loclzj ndl/cath spi dx/ther njx N/A 11/03/2014    Procedure: CERVICAL EPIDURAL;  Surgeon: Khai Iniguez MD;  [facility]    Fluor gid & loclzj ndl/cath spi dx/ther njx N/A 11/24/2014    Procedure: CERVICAL EPIDURAL;  Surgeon: Khai Iniguez MD;  [facility]    Fluor gid & loclzj ndl/cath spi dx/ther njx N/A 02/27/2015    Procedure: THORACIC EPIDURAL;  Surgeon: Khai Iniguez MD;  [facility]: [facility] [place] [facility]    Fluor gid & loclzj ndl/cath spi dx/ther njx N/A 03/13/2015    Procedure: THORACIC EPIDURAL;  Surgeon: Heron Ramirez MD;  [facility]    Fluor gid & loclzj ndl/cath spi dx/ther njx N/A 03/27/2015    Procedure: THORACIC EPIDURAL;  Surgeon: Khai Iniguez MD;  Location: [facility] [place] [facility]    Injection, w/wo contrast, dx/therapeutic substance, epidural/subarachnoid; cervical/thoracic  07/09/2012    Procedure: THORACIC EPIDURAL;  Surgeon: Heron Ramirez MD;  Location: [facility] [place] [facility]    Injection, w/wo contrast, dx/therapeutic substance, epidural/subarachnoid; cervical/thoracic  07/24/2012    Procedure: TRANSFORAMINAL EPIDURAL - CERVICAL;  Surgeon: Khai Iniguez MD;  Location: [facility] [place] [facility]    Injection, w/wo contrast, dx/therapeutic substance, epidural/subarachnoid; cervical/thoracic N/A 10/23/2014    Procedure: CERVICAL EPIDURAL;  Surgeon: Khai Iniguez MD;  Location: [facility] [place] [facility]    Injection, w/wo contrast, dx/therapeutic substance, epidural/subarachnoid; cervical/thoracic N/A 11/03/2014    Procedure: CERVICAL EPIDURAL;  Surgeon: Khai Iniguez,  MD;  [facility]    Injection, w/wo contrast, dx/therapeutic substance, epidural/subarachnoid; cervical/thoracic N/A 11/24/2014    Procedure: CERVICAL EPIDURAL;  Surgeon: Khai Iniguez MD;  Location: [facility] [place] [facility]    Injection, w/wo contrast, dx/therapeutic substance, epidural/subarachnoid; cervical/thoracic N/A 02/27/2015    Procedure: THORACIC EPIDURAL;  Surgeon: Khai Iniguez MD;  Location: [facility] [place] [facility]    Injection, w/wo contrast, dx/therapeutic substance, epidural/subarachnoid; cervical/thoracic N/A 03/13/2015    Procedure: THORACIC EPIDURAL;  Surgeon: Heron Ramirez MD;  Location: [facility] [place] [facility]    Injection, w/wo contrast, dx/therapeutic substance, epidural/subarachnoid; cervical/thoracic N/A 03/27/2015    Procedure: THORACIC EPIDURAL;  Surgeon: Khai Iniguez MD;  Location: [facility] [place] [facility]    M-sedaj by  phys perfrmg svc 5+ yr  07/09/2012    Procedure: THORACIC EPIDURAL;  Surgeon: Heron Ramirez MD;  Location: [facility] [place] [facility]    M-sedaj by  phys perfrmg svc 5+ yr  10/23/2014    Procedure: ;  Surgeon: Khai Iniguez MD;  Location: [facility] [place] [facility]    M-sedaj by  phys perfrmg svc 5+ yr N/A 11/03/2014    Procedure: CERVICAL EPIDURAL;  Surgeon: Khai Iniguez MD;  Location: [facility] [place] [facility]    M-sedaj by  phys perfrmg svc 5+ yr N/A 11/24/2014    Procedure: CERVICAL EPIDURAL;  Surgeon: Khai Iniguez MD;  Location: [facility]    M-sedaj by  phys perfrmg svc 5+ yr N/A 02/27/2015    Procedure: THORACIC EPIDURAL;  Surgeon: Khai Iniguez MD;  Location: [facility]    M-sedaj by  phys perfrmg svc 5+ yr N/A 03/13/2015    Procedure: THORACIC EPIDURAL;  Surgeon: Heron Ramirez MD;  Location: [facility]    M-sedaj by  phys perfrmg svc 5+ yr N/A 03/27/2015    Procedure: THORACIC EPIDURAL;   Surgeon: Khai Iniguez MD;  [place] [facility]    Upper gi endoscopy,biopsy  05/10/2012       FAMILY HISTORY:      Family History   Problem Relation Age of Onset    Other (Diabetes Mellitus[other]) Mother     Cancer Mother         Lung cancer    Cancer Father         Lung cancer    Heart Disease Father     Other (Gastric ca[other]) Other         fam hx       SOCIAL HISTORY:     Social History     Socioeconomic History    Marital status:    Tobacco Use    Smoking status: Never    Smokeless tobacco: Never   Vaping Use    Vaping status: Never Used   Substance and Sexual Activity    Alcohol use: Yes     Alcohol/week: 6.0 standard drinks of alcohol     Types: 6 Cans of beer per week     Comment: social    Drug use: No   Other Topics Concern    Caffeine Concern Yes    Stress Concern No    Weight Concern Yes    Special Diet No    Exercise Yes    Seat Belt Yes       REVIEW OF SYSTEMS:   Review of Systems     See HPI for pertinent positives and negatives     PHYSICAL EXAM:     Vitals:    04/19/24 0918   BP: 132/84   Pulse: 67   SpO2: 96%   Weight: 202 lb 9.6 oz (91.9 kg)   Height: 6' 2\" (1.88 m)       Physical Exam     Physical Exam  Constitutional:       Appearance: Normal appearance.   Neurological:      General: No focal deficit present.      Mental Status: She is alert and oriented to person, place, and time.   Psychiatric:         Mood and Affect: Mood normal.    ASSESSMENT AND PLAN:     Patient has been struggling with chronic pain and muscle tightness, insomnia and stress/anxiety.  I believe patient's chronic pain is multifactorial.  Patient is an MTHFR gene mutation individual we have to be concerned about methylation.  I am recommending doing further testing to look for autoimmune conditions such as Hashimoto's as well as getting an MAIA C-reactive protein and sed rate.    Had in-depth conversation on how gut health can affect how he is feeling and stress and anxiety can breakdown gut  barrier.  He will use probiotic to help heal this.  In the future I may consider NAC, DGL, IgG there are supplementation to help heal the gut lining.  Recommending a full anti-inflammatory diet.    Recommended Nutri eval to look for oxidative methylation and toxicity components to his chronic pain.    I am recommending omega 3 for inflammation. Future we will consider rheumate once he is off plavix.     I have concerns of a flattened cortisol curve cortisol curve saliva testing ordered    Micronutrient testing will be ordered via blood    1. Other chronic pain  - Connective Tissue Disease (MAIA) Screen, Reflex Specific Antibody; Future  - C-RP/High Sensitivity; Future  - Sed Rate, Westergren (Automated) [E]; Future  - Free T3 (Triiodothryronine); Future  - Reverse T3, Serum; Future  - Thyroid Peroxidase (TPO) AB; Future  - Thyroid Antithyroglobulin AB; Future  - Free T4, (Free Thyroxine); Future  - Assay, Thyroid Stim Hormone; Future  - Testosterone,Total and Weakly Bound w/ SHBG; Future  - Selenium, Serum; Future  - Zinc, Plasma Or Serum; Future  - Copper, Serum or Plasma; Future  - Folic Acid Serum (Folate); Future  - Vitamin B6; Future  - Glassbeamator  - Magnesium [E]; Future    2. 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 Insomnia, unspecified type    4. Other fatigue  - Connective Tissue Disease (MAIA) Screen, Reflex Specific Antibody; Future  - C-RP/High Sensitivity; Future  - Sed Rate, Westergren (Automated) [E]; Future  - Free T3 (Triiodothryronine); Future  - Reverse T3, Serum; Future  - Thyroid Peroxidase (TPO) AB; Future  - Thyroid Antithyroglobulin AB; Future  - Free T4, (Free Thyroxine); Future  - Assay, Thyroid Stim Hormone; Future  - Testosterone,Total and Weakly Bound w/ SHBG; Future  - Selenium, Serum; Future  - Zinc, Plasma Or Serum; Future  - Copper, Serum or Plasma; Future  - Folic Acid Serum (Folate); Future  - Vitamin B6; Future  - Glassbeamator  - Magnesium [E]; Future    5.  Abdominal pain, unspecified abdominal location      Time spent with patient: Over 60 minutes spent in chart review and in direct communication with patient obtaining and reviewing history, creating a unique care plan, explaining the rationale for treatment, reviewing potential SE and overall treatment plan,  documenting all clinical information in Epic. Over 50% of this time was in education, counseling and coordination of care.     Problem List Items Addressed This Visit          Sleep    Insomnia    Relevant Medications    traZODone 50 MG Oral Tab     Other Visit Diagnoses       Other chronic pain    -  Primary    Relevant Medications    traZODone 50 MG Oral Tab    Other Relevant Orders    Connective Tissue Disease (MAIA) Screen, Reflex Specific Antibody    C-RP/High Sensitivity    Sed Rate, Westergren (Automated) [E]    Free T3 (Triiodothryronine)    Reverse T3, Serum    Thyroid Peroxidase (TPO) AB    Thyroid Antithyroglobulin AB    Free T4, (Free Thyroxine)    Assay, Thyroid Stim Hormone    Testosterone,Total and Weakly Bound w/ SHBG    Selenium, Serum    Zinc, Plasma Or Serum    Copper, Serum or Plasma    Folic Acid Serum (Folate)    Vitamin B6    PowerMessageator    Magnesium [E]    Stress        Relevant Medications    traZODone 50 MG Oral Tab    Other Relevant Orders    PowerMessageator    Other fatigue        Relevant Orders    Connective Tissue Disease (MAIA) Screen, Reflex Specific Antibody    C-RP/High Sensitivity    Sed Rate, Westergren (Automated) [E]    Free T3 (Triiodothryronine)    Reverse T3, Serum    Thyroid Peroxidase (TPO) AB    Thyroid Antithyroglobulin AB    Free T4, (Free Thyroxine)    Assay, Thyroid Stim Hormone    Testosterone,Total and Weakly Bound w/ SHBG    Selenium, Serum    Zinc, Plasma Or Serum    Copper, Serum or Plasma    Folic Acid Serum (Folate)    Vitamin B6    PowerMessageator    Magnesium [E]    Abdominal pain, unspecified abdominal location                 Orders  Placed This Visit:  Orders Placed This Encounter   Procedures    Connective Tissue Disease (MAIA) Screen, Reflex Specific Antibody    C-RP/High Sensitivity    Sed Rate, Westergren (Automated) [E]    Free T3 (Triiodothryronine)    Reverse T3, Serum    Thyroid Peroxidase (TPO) AB    Thyroid Antithyroglobulin AB    Free T4, (Free Thyroxine)    Assay, Thyroid Stim Hormone    Testosterone,Total and Weakly Bound w/ SHBG    Selenium, Serum    Zinc, Plasma Or Serum    Copper, Serum or Plasma    Folic Acid Serum (Folate)    Vitamin B6    Magnesium [E]     No orders of the defined types were placed in this encounter.      Patient Instructions   Clarita suggs or tyra chi for meditation movement exercises.   Recommend talk therapy possibly EMDR and/or CBT therapy   Acupuncture can be beneficial for stress/anxiety, sleep and pain   Rosi testing will be sent you your email to set up and order     '  Take one daily     The following website can be utilized to purchase supplements.       2 gel capsules daily     [email or web address]        ANTI-INFLAMMATORY LIFESTYLE FOODS    General Diet Recommendations   Eat protein at every meal emphasizing salmon, mackerel, sardines, and herring  Limit red meat consumption to two meals per week  Avoid omega 6 oils that aggravate inflammation   corn oil  soybean oil  safflower oil   sun flower oil  Avoid hydrogenated vegetable oils and fried foods  Avoid white flour and all refined carbohydrates including cereals, pasta, bread, bagels and English muffins. Replace these with brown rice, sweet potato, or steel-cut oats  Eat spices such as deneen, garlic, turmeric, cilantro, rosemary, oregano, and thyme.   Drink plenty of water (enough until urine is clear/faint yellow color and odorless)  Eat 5 servings of fresh vegetables daily   Eat no more than 3 servings of fresh fruit daily    Avoid sugar      Include in abundance   Fish: Greenback, Mackerel, Sardines, or Herring  Dark Green  Leafy Vegetables  Yellow, Orange, and Red Vegetables  Bok abril  Celery  Beets  Broccoli  Blueberries  Pineapple  Walnuts  Coconut Oil  Luis Miguel Seeds  Flax Seeds  Turmeric  Deneen  Olive oil   Avoid   Sugar  Artificial Sweeteners  Saturated and Trans fats   Hydrogenated Oils  Canola  Corn Oil  Peanut  Soy  Omega 6 Fatty Acids  Refined Carbohydrates  Processed foods  MSG  Gluten- Breads, Crackers, Pastries, Cereals  Dairy/Casein  Alcohol  Deli Meats       RECIPE IDEAS  Breakfast   ENERGIZING PINEAPPLE SMOOTHIE  Ingredients  1 cup brewed and cooled green tea  2 cups spinach or kale  1 cup frozen pineapple chunks  ? cup cucumber, peeled and cut into large chunks)  ½ cup frozen huong chunks  ½ of a medium banana, peeled  ½” fresh deneen - peeled and cut from stalk (about ½ tsp)  ¼ tsp ground turmeric  3 mint leaves - rough chopped  1 scoop protein powder  1 Tbsp luis miguel seeds  4-5 ice cubes (or more or less to personal desired consistency)    Directions  1.  Combine all the ingredients, except the luis miguel seeds, in a high speed .    2.  Add luis miguel seeds at the end of the blending process so they don’t stick to the  container.    3. If you like your smoothie thicker, add ice cubes and blend until desired consistency is met. AVOCADO TOAST WITH EGG  Ingredients  1 slice of gluten-free bread, toasted  1½ tsp ghee  ½ an avocado  Handful of spinach  1 egg, poached or scrambled  Red pepper flakes  This combination is simple to create.    Directions  1.  Toast the gluten-free bread and top with ghee.    2.  Spread the avocado onto the toast. Place fresh spinach leaves on top of the avocado and then top all with a poached or scrambled egg and finish it off with a sprinkle of red pepper flakes.    3. Enjoy open faced with a fork and knife, or add a second piece of toast to make it a sandwich.         LUIS MIGUEL QUINOA PORRIDGE  Ingredients  1 cup thick cashew milk  2 cups cooked quinoa  1 cup fresh organic blueberries (or frozen)  ¼  cup toasted walnuts  ½ tsp ground cinnamon  2 tsp raw honey  1 Tbsp luis miguel seeds    Directions  1.  Combine the quinoa and cashew milk in a saucepan and slowly warm over medium low heat.    2.  Stir in blueberries, cinnamon and walnuts until all are evenly warmed. Remove from heat and stir in raw honey. Top with luis miguel seeds.    3.  Serve in bowls and top with raw cacao nibs for an added pop of antioxidants.                 http://www.[employer]/health/centers/rheumatoid_arthritis/articles/famous_chefs_recipes_for_your_antiinflammatory_diet.aspx                    Lunch   LENTIL VEGETABLE SOUP  Ingredients   1 pound Haitian green lentils, dry   4 cups chopped yellow onions (3 large onions)   4 cups chopped leeks, white part only (2 leeks)   1 tablespoon minced garlic (3 cloves)   1/4 cup good olive oil, plus additional for drizzling on top   1 tablespoon kosher salt   1-½ teaspoons freshly ground black pepper   1 tablespoon minced fresh thyme leaves or 1 teaspoon dried   1 teaspoon ground cumin   3 cups medium-diced celery (8 stalks)   3 cups medium-diced carrots (4-6 carrots)   3 quarts chicken stock   1/4 cup tomato paste   2 tablespoons red wine or red wine vinegar   Freshly grated Parmesan cheese     Directions  1. In a large bowl, cover the lentils with boiling water and let sit for 15 minutes. Drain.     2. In a large stockpot over medium heat, sauté the onions, leeks, and garlic with the olive oil, salt, pepper, thyme and cumin for 20 minutes, until the vegetables are translucent and very tender.    3. Add the celery and carrots and sauté for 10 minutes. Add the chicken stock, tomato paste and lentils. Cover and bring to a boil. Reduce heat and simmer uncovered for 1 hour, until the lentils are cooked through.     4. Check the seasonings. Add the red wine and serve hot, drizzled with olive oil and sprinkled with grated Parmesan.  GRILLED EGGPLANT SALAD SERVES  Ingredients   1 Italian eggplant, cut into  1-inch-thick slices   1 large red onion, cut into rounds Canola oil   1 avocado, halved, pitted, peeled and chopped   1 tablespoon red wine vinegar   1 teaspoon Robbie mustard   1 tablespoon coarsely chopped oregano leaves   Honey   Olive oil  Salt   Freshly ground black pepper   1 lemon, zested     Directions  1. Brush the eggplant and red onions with canola oil and arrange on the grill. Cook the eggplant until soft and grill the onions until they have a slight mayelin.     2. Remove from the grill to a cutting board and let cool slightly. Once cool, roughly chop and add them to a serving bowl along with the avocado.     3. In a small bowl, whisk together the red wine vinegar, Robbie and oregano.     4. Add honey and olive oil, to taste, and blend until emulsified. Season with salt and pepper, to taste. 5. Add the dressing to the eggplant mixture and toss.                                 WILD RICE PILAF  Ingredients  Sanaz Wild Rice  Olive oil  Vegetable stock  1 cup sliced mushrooms  1 minced shallot  ½ cup chopped celery    Directions  1. Follow the cooking instructions on a package of “[employer] Wild Rice Blend” using olive oil instead of butter and vegetable stock instead of water.     2. In a non-stick pan saute 1 cup of sliced mushrooms, 1 minced shallot, ½ cup chopped celery, and 1 clove of minced garlic in 3 tbsp olive oil.     3. When the rice is done, combine with the mushroom mixture and serve.      http://www.[employer].com/health/centers/rheumatoid_arthritis/articles/famous_chefs_recipes_for_your_antiinflammatory_diet.aspx  Dinner   ROASTED CHICKEN WITH BALSAMIC VINAIGRETTE  Ingredients   1/4 cup balsamic vinegar   2 tablespoons Robbie mustard   2 tablespoons fresh lemon juice   2 garlic cloves, chopped   2 tablespoons olive oil   Salt   Freshly ground black pepper   1 (4-pound) whole chicken, cut into pieces (reserve giblets, neck and backbone for another use)   1/2 cup low-salt chicken broth   1 teaspoon  lemon zest   1 tablespoon chopped fresh parsley leaves     Directions  1. Whisk the vinegar, mustard, lemon juice, garlic, olive oil, salt and pepper in small bowl to blend.     2. Combine the vinaigrette and chicken pieces in a large, resealable plastic bag; seal the bag and toss to coat. Refrigerate, turning the chicken pieces occasionally, for at least 2 hours and up to one day.     3. Preheat the oven to 400 degrees F. Remove chicken from the bag and arrange pieces on a large, greased baking dish.     4. Roast until the chicken is just cooked through, about 1 hour. If your chicken browns too quickly, cover it with foil for the remaining cooking time.    5. Transfer the chicken to a serving platter.    HERBED SALMON  Ingredients  1/2 seedless cucumber   2 small plum tomatoes   1 shallot or 1/4 red onion, finely chopped, divided   2 tablespoons Robbie mustard   2 tablespoons sugar   1/4 cup white wine vinegar   1/2 cup extra-virgin olive oil, plus a drizzle   1/4 cup finely chopped fresh dill   Salt  Freshly ground black pepper 4 (6-ounce) skinless salmon fillets   Seafood seasoning (recommended: [facility])     Directions  1. Dice cucumber into 1/4-inch pieces - to peel or not to peel is up to you. Seed and dice the tomatoes into 1/4-inch pieces. Combine cucumber, tomatoes and half the shallots or red onion in a bowl and set aside.     2. In a small bowl, whisk together mustard, sugar and white wine vinegar and remaining half of the shallots. Stream in extra-virgin olive oil while continuing to whisk. Stir in dill and season dressing with salt and pepper to taste.     3. Season the salmon with seafood seasoning and a little black pepper.     4. Heat a nonstick skillet with a drizzle of extra-virgin olive oil over medium-high heat. Place salmon rounded side down and cook until parkinson and a little crispy at the edges, 3-4 minutes. Flip and cook 2 minutes more for a pink center, or 4 minutes for opaque fish.     5.  Transfer salmon to dinner or serving plates, top with the cucumber-tomato relish and cover with a liberal amount of dill dressing.  BLACK FRIED RICE W/ SNAP PEAS  Ingredients  2 tablespoons Organic Extra Virgin Coconut Oil  2 medium carrots, diced  1 small yellow onion, diced  1 bunch scallions, white and green parts , thinly sliced  1 cup thinly sliced snap peas  2 garlic cloves, minced  1 tablespoon minced fresh deneen  3 cups cooked black rice (from 1 cup uncooked)  3 tablespoons Liquid Aminos  2 teaspoons toasted sesame oil  1 teaspoon sriracha  2 eggs, beaten  1 tablespoon Organic Shelled Hemp Seed    Directions  1. In a large wok or nonstick skillet heat the coconut oil. Sauté the carrot, onion, and white scallion over high heat until soft and beginning to brown, about 5 minutes.     2.  Add the snap peas, garlic, deneen, and green scallions and stir-malik until fragrant, another 2 minutes. Fold in the rice and stir-malik until well-coated in the vegetable mixture and beginning to toast, 2 minutes.     3.  Add the liquid aminos, sesame oil, and sriracha and stir to combine. Push the rice to the side of the pan to create a well. Pour the eggs into the center and cook, stirring gently, until nearly set.     4.  Toss the fried rice with the eggs and hemp seeds. Transfer the fried rice to bowls and serve right away.            http://[employer]/fried-forbiddenblack-rice-recipe-with-snap-peas-and-scallions/   http://www.[employer].com/health/centers/rheumatoid_arthritis/articles/famous_chefs_recipes_for_your_antiinflammatory_diet.aspx    Baked Goods/Snacks   BANANA NUT MUFFINS  Ingredients  1 cup almond flour  1 cup gluten-free sprouted flour blend (brown rice, oat and sorghum blend)  1 teaspoon cinnamon  ½ teaspoon salt  2 teaspoons baking soda  2 eggs or 2 flax eggs (flax eggs: 1/4 cup water + 2 tablespoons ground flax)  ¼ cup water  ½ cup maple syrup  2 very ripe bananas, mashed  ½ cup walnuts,  crushed  TOPPINGS:  1 banana, cut in half and thinly sliced  Crushed walnuts     Directions   1.  Preheat oven to 350 F.    2.  Mix all ingredients in a large bowl.  3.  Fill greased or paper-lined muffin cups 2/3 full.  4. Add banana slice and walnuts and bake for 15-18 minutes.   NO BAKE ALMOND BARS  Ingredients  3/4 cup almond flour  3/4 cup unsweetened finely shredded coconut  3/4 cup equivalent powdered sweetener (Use powdered coconut sugar for Paleo or powdered sweetener)  1 cup + 2 Tbsp almond butter (or any nut butter)  2 Tbsp coconut oil  4 1/2 oz dark chocolate?    Directions  1.  In a large bowl, combine the almond flour, coconut, and sweetener.     2. Over medium-low heat melt 1 cup of almond butter and coconut oil.    3. Once melted, add the almond butter to the dry ingredients and mix well.    4. Press the mixture into a 8\" x 8\" baking dish.    5. Over medium-low heat melt 2 Tbsp of almond butter and the chocolate.    6. Once melted, pour the chocolate over the almond butter mixture and smooth out the top for even coverage.    7. Refrigerate for 2 hours or until the almond butter mixture has set. To reduce the amount of time for the almond butter mixture to set place the bars in the freezer until set then cut into 12 even bars.   BAKED APPLES  Ingredients  4 medium apples  1 lemon (juice and rind)  1 cinnamon stick  Currants or raisins     Directions  1.  Place 4 medium apples that have been washed and cored in a large glass oven proof bowl (with lid).     2.  Place a strip of lemon rind, ¼ of a whole vanilla bean, 1 cinnamon stick, and some currants or raisins inside each apple. Drizzle apples with ¼ cup lemon juice and scatter remainder of currants around them.     3.  Bake covered at 350 degrees for 60 to 75 minutes. Serve warm or cold, you can also drizzle with almond milk.       https://[employer]xe.com/healthy-snacks/      Return in about 8 weeks (around 6/14/2024) for 60.    Patient affirmed  understanding of plan and all questions were answered.     Carolina Hand PA-C

## 2024-04-19 NOTE — PATIENT INSTRUCTIONS
Clarita suggs or tyra chi for meditation movement exercises.   Recommend talk therapy possibly EMDR and/or CBT therapy   Acupuncture can be beneficial for stress/anxiety, sleep and pain   Rosi testing will be sent you your email to set up and order     '  Take one daily     The following website can be utilized to purchase supplements.       2 gel capsules daily     [email or web address]        ANTI-INFLAMMATORY LIFESTYLE FOODS    General Diet Recommendations   Eat protein at every meal emphasizing salmon, mackerel, sardines, and herring  Limit red meat consumption to two meals per week  Avoid omega 6 oils that aggravate inflammation   corn oil  soybean oil  safflower oil   sun flower oil  Avoid hydrogenated vegetable oils and fried foods  Avoid white flour and all refined carbohydrates including cereals, pasta, bread, bagels and English muffins. Replace these with brown rice, sweet potato, or steel-cut oats  Eat spices such as deneen, garlic, turmeric, cilantro, rosemary, oregano, and thyme.   Drink plenty of water (enough until urine is clear/faint yellow color and odorless)  Eat 5 servings of fresh vegetables daily   Eat no more than 3 servings of fresh fruit daily    Avoid sugar      Include in abundance   Fish: [place], Mackerel, Sardines, or Herring  Dark Green Leafy Vegetables  Yellow, Orange, and Red Vegetables  Bok abril  Celery  Beets  Broccoli  Blueberries  Pineapple  Walnuts  Coconut Oil  Triston Seeds  Flax Seeds  Turmeric  Deneen  Olive oil   Avoid   Sugar  Artificial Sweeteners  Saturated and Trans fats   Hydrogenated Oils  Canola  Corn Oil  Peanut  Soy  Omega 6 Fatty Acids  Refined Carbohydrates  Processed foods  MSG  Gluten- Breads, Crackers, Pastries, Cereals  Dairy/Casein  Alcohol  Deli Meats       RECIPE IDEAS  Breakfast   ENERGIZING PINEAPPLE SMOOTHIE  Ingredients  1 cup brewed and cooled green tea  2 cups spinach or kale  1 cup frozen pineapple chunks  ? cup cucumber, peeled and cut  into large chunks)  ½ cup frozen huong chunks  ½ of a medium banana, peeled  ½” fresh deneen - peeled and cut from stalk (about ½ tsp)  ¼ tsp ground turmeric  3 mint leaves - rough chopped  1 scoop protein powder  1 Tbsp luis miguel seeds  4-5 ice cubes (or more or less to personal desired consistency)    Directions  1.  Combine all the ingredients, except the luis miguel seeds, in a high speed .    2.  Add luis miguel seeds at the end of the blending process so they don’t stick to the  container.    3. If you like your smoothie thicker, add ice cubes and blend until desired consistency is met. AVOCADO TOAST WITH EGG  Ingredients  1 slice of gluten-free bread, toasted  1½ tsp ghee  ½ an avocado  Handful of spinach  1 egg, poached or scrambled  Red pepper flakes  This combination is simple to create.    Directions  1.  Toast the gluten-free bread and top with ghee.    2.  Spread the avocado onto the toast. Place fresh spinach leaves on top of the avocado and then top all with a poached or scrambled egg and finish it off with a sprinkle of red pepper flakes.    3. Enjoy open faced with a fork and knife, or add a second piece of toast to make it a sandwich.         LUIS MIGUEL QUINOA PORRIDGE  Ingredients  1 cup thick cashew milk  2 cups cooked quinoa  1 cup fresh organic blueberries (or frozen)  ¼ cup toasted walnuts  ½ tsp ground cinnamon  2 tsp raw honey  1 Tbsp luis migule seeds    Directions  1.  Combine the quinoa and cashew milk in a saucepan and slowly warm over medium low heat.    2.  Stir in blueberries, cinnamon and walnuts until all are evenly warmed. Remove from heat and stir in raw honey. Top with luis miguel seeds.    3.  Serve in bowls and top with raw cacao nibs for an added pop of antioxidants.                 http://www.[employer].com/health/centers/rheumatoid_arthritis/articles/famous_chefs_recipes_for_your_antiinflammatory_diet.aspx                    Lunch   LENTIL VEGETABLE SOUP  Ingredients   1 pound Latvian green lentils,  dry   4 cups chopped yellow onions (3 large onions)   4 cups chopped leeks, white part only (2 leeks)   1 tablespoon minced garlic (3 cloves)   1/4 cup good olive oil, plus additional for drizzling on top   1 tablespoon kosher salt   1-½ teaspoons freshly ground black pepper   1 tablespoon minced fresh thyme leaves or 1 teaspoon dried   1 teaspoon ground cumin   3 cups medium-diced celery (8 stalks)   3 cups medium-diced carrots (4-6 carrots)   3 quarts chicken stock   1/4 cup tomato paste   2 tablespoons red wine or red wine vinegar   Freshly grated Parmesan cheese     Directions  1. In a large bowl, cover the lentils with boiling water and let sit for 15 minutes. Drain.     2. In a large stockpot over medium heat, sauté the onions, leeks, and garlic with the olive oil, salt, pepper, thyme and cumin for 20 minutes, until the vegetables are translucent and very tender.    3. Add the celery and carrots and sauté for 10 minutes. Add the chicken stock, tomato paste and lentils. Cover and bring to a boil. Reduce heat and simmer uncovered for 1 hour, until the lentils are cooked through.     4. Check the seasonings. Add the red wine and serve hot, drizzled with olive oil and sprinkled with grated Parmesan.  GRILLED EGGPLANT SALAD SERVES  Ingredients   1 Italian eggplant, cut into 1-inch-thick slices   1 large red onion, cut into rounds Canola oil   1 avocado, halved, pitted, peeled and chopped   1 tablespoon red wine vinegar   1 teaspoon Robbie mustard   1 tablespoon coarsely chopped oregano leaves   Honey   Olive oil  Salt   Freshly ground black pepper   1 lemon, zested     Directions  1. Brush the eggplant and red onions with canola oil and arrange on the grill. Cook the eggplant until soft and grill the onions until they have a slight mayelin.     2. Remove from the grill to a cutting board and let cool slightly. Once cool, roughly chop and add them to a serving bowl along with the avocado.     3. In a small bowl, whisk  together the red wine vinegar, Robbie and oregano.     4. Add honey and olive oil, to taste, and blend until emulsified. Season with salt and pepper, to taste. 5. Add the dressing to the eggplant mixture and toss.                                 WILD RICE PILAF  Ingredients  Sanaz Wild Rice  Olive oil  Vegetable stock  1 cup sliced mushrooms  1 minced shallot  ½ cup chopped celery    Directions  1. Follow the cooking instructions on a package of “Sanaz Wild Rice Blend” using olive oil instead of butter and vegetable stock instead of water.     2. In a non-stick pan saute 1 cup of sliced mushrooms, 1 minced shallot, ½ cup chopped celery, and 1 clove of minced garlic in 3 tbsp olive oil.     3. When the rice is done, combine with the mushroom mixture and serve.      http://www.[employer]/health/centers/rheumatoid_arthritis/articles/famous_chefs_recipes_for_your_antiinflammatory_diet.aspx  Dinner   ROASTED CHICKEN WITH BALSAMIC VINAIGRETTE  Ingredients   1/4 cup balsamic vinegar   2 tablespoons Robbie mustard   2 tablespoons fresh lemon juice   2 garlic cloves, chopped   2 tablespoons olive oil   Salt   Freshly ground black pepper   1 (4-pound) whole chicken, cut into pieces (reserve giblets, neck and backbone for another use)   1/2 cup low-salt chicken broth   1 teaspoon lemon zest   1 tablespoon chopped fresh parsley leaves     Directions  1. Whisk the vinegar, mustard, lemon juice, garlic, olive oil, salt and pepper in small bowl to blend.     2. Combine the vinaigrette and chicken pieces in a large, resealable plastic bag; seal the bag and toss to coat. Refrigerate, turning the chicken pieces occasionally, for at least 2 hours and up to one day.     3. Preheat the oven to 400 degrees F. Remove chicken from the bag and arrange pieces on a large, greased baking dish.     4. Roast until the chicken is just cooked through, about 1 hour. If your chicken browns too quickly, cover it with foil for the remaining  cooking time.    5. Transfer the chicken to a serving platter.    HERBED SALMON  Ingredients  1/2 seedless cucumber   2 small plum tomatoes   1 shallot or 1/4 red onion, finely chopped, divided   2 tablespoons Robbie mustard   2 tablespoons sugar   1/4 cup white wine vinegar   1/2 cup extra-virgin olive oil, plus a drizzle   1/4 cup finely chopped fresh dill   Salt  Freshly ground black pepper 4 (6-ounce) skinless salmon fillets   Seafood seasoning (recommended: [facility])     Directions  1. Dice cucumber into 1/4-inch pieces - to peel or not to peel is up to you. Seed and dice the tomatoes into 1/4-inch pieces. Combine cucumber, tomatoes and half the shallots or red onion in a bowl and set aside.     2. In a small bowl, whisk together mustard, sugar and white wine vinegar and remaining half of the shallots. Stream in extra-virgin olive oil while continuing to whisk. Stir in dill and season dressing with salt and pepper to taste.     3. Season the salmon with seafood seasoning and a little black pepper.     4. Heat a nonstick skillet with a drizzle of extra-virgin olive oil over medium-high heat. Place salmon rounded side down and cook until parkinson and a little crispy at the edges, 3-4 minutes. Flip and cook 2 minutes more for a pink center, or 4 minutes for opaque fish.     5. Transfer salmon to dinner or serving plates, top with the cucumber-tomato relish and cover with a liberal amount of dill dressing.  BLACK FRIED RICE W/ SNAP PEAS  Ingredients  2 tablespoons Organic Extra Virgin Coconut Oil  2 medium carrots, diced  1 small yellow onion, diced  1 bunch scallions, white and green parts , thinly sliced  1 cup thinly sliced snap peas  2 garlic cloves, minced  1 tablespoon minced fresh deneen  3 cups cooked black rice (from 1 cup uncooked)  3 tablespoons Liquid Aminos  2 teaspoons toasted sesame oil  1 teaspoon sriracha  2 eggs, beaten  1 tablespoon Organic Shelled Hemp Seed    Directions  1. In a large wok  or nonstick skillet heat the coconut oil. Sauté the carrot, onion, and white scallion over high heat until soft and beginning to brown, about 5 minutes.     2.  Add the snap peas, garlic, deneen, and green scallions and stir-malik until fragrant, another 2 minutes. Fold in the rice and stir-malik until well-coated in the vegetable mixture and beginning to toast, 2 minutes.     3.  Add the liquid aminos, sesame oil, and sriracha and stir to combine. Push the rice to the side of the pan to create a well. Pour the eggs into the center and cook, stirring gently, until nearly set.     4.  Toss the fried rice with the eggs and hemp seeds. Transfer the fried rice to bowls and serve right away.            http://[employer]/fried-forbiddenblack-rice-recipe-with-snap-peas-and-scallions/   http://www.[employer]/health/centers/rheumatoid_arthritis/articles/famous_chefs_recipes_for_your_antiinflammatory_diet.aspx    Baked Goods/Snacks   BANANA NUT MUFFINS  Ingredients  1 cup almond flour  1 cup gluten-free sprouted flour blend (brown rice, oat and sorghum blend)  1 teaspoon cinnamon  ½ teaspoon salt  2 teaspoons baking soda  2 eggs or 2 flax eggs (flax eggs: 1/4 cup water + 2 tablespoons ground flax)  ¼ cup water  ½ cup maple syrup  2 very ripe bananas, mashed  ½ cup walnuts, crushed  TOPPINGS:  1 banana, cut in half and thinly sliced  Crushed walnuts     Directions   1.  Preheat oven to 350 F.    2.  Mix all ingredients in a large bowl.  3.  Fill greased or paper-lined muffin cups 2/3 full.  4. Add banana slice and walnuts and bake for 15-18 minutes.   NO BAKE ALMOND BARS  Ingredients  3/4 cup almond flour  3/4 cup unsweetened finely shredded coconut  3/4 cup equivalent powdered sweetener (Use powdered coconut sugar for Paleo or powdered sweetener)  1 cup + 2 Tbsp almond butter (or any nut butter)  2 Tbsp coconut oil  4 1/2 oz dark chocolate?    Directions  1.  In a large bowl, combine the almond flour, coconut, and  sweetener.     2. Over medium-low heat melt 1 cup of almond butter and coconut oil.    3. Once melted, add the almond butter to the dry ingredients and mix well.    4. Press the mixture into a 8\" x 8\" baking dish.    5. Over medium-low heat melt 2 Tbsp of almond butter and the chocolate.    6. Once melted, pour the chocolate over the almond butter mixture and smooth out the top for even coverage.    7. Refrigerate for 2 hours or until the almond butter mixture has set. To reduce the amount of time for the almond butter mixture to set place the bars in the freezer until set then cut into 12 even bars.   BAKED APPLES  Ingredients  4 medium apples  1 lemon (juice and rind)  1 cinnamon stick  Currants or raisins     Directions  1.  Place 4 medium apples that have been washed and cored in a large glass oven proof bowl (with lid).     2.  Place a strip of lemon rind, ¼ of a whole vanilla bean, 1 cinnamon stick, and some currants or raisins inside each apple. Drizzle apples with ¼ cup lemon juice and scatter remainder of currants around them.     3.  Bake covered at 350 degrees for 60 to 75 minutes. Serve warm or cold, you can also drizzle with almond milk.       https://[employer].com/healthy-snacks/

## 2024-04-20 LAB
CRP SERPL HS-MCNC: 0.24 MG/L (ref ?–3)
MAGNESIUM SERPL-MCNC: 2.3 MG/DL (ref 1.6–2.6)
T3FREE SERPL-MCNC: 2.18 PG/ML (ref 2.4–4.2)
T4 FREE SERPL-MCNC: 1 NG/DL (ref 0.8–1.7)
TSI SER-ACNC: 1.3 MIU/ML (ref 0.36–3.74)

## 2024-04-22 ENCOUNTER — PATIENT MESSAGE (OUTPATIENT)
Dept: FAMILY MEDICINE CLINIC | Facility: CLINIC | Age: 64
End: 2024-04-22

## 2024-04-22 DIAGNOSIS — M79.7 FIBROMYALGIA: Primary | ICD-10-CM

## 2024-04-22 NOTE — TELEPHONE ENCOUNTER
From: Sanket Anderson  To: Campbell Giles  Sent: 4/22/2024 7:49 AM CDT  Subject: Meds    Dr Giles I’ve been off gabapentin since 4/10. I am only sleeping 2 hours per night. Still having anxiety and some muscle pain not letting me sleep. Do we try another medication for the fibromyalgia. I have appointment with you on Friday afternoon so I can try something for a few days before our visit. Im still taking lexapro with some side effects hoping they will diminish.

## 2024-04-24 ENCOUNTER — LAB ENCOUNTER (OUTPATIENT)
Dept: LAB | Age: 64
End: 2024-04-24
Attending: PHYSICIAN ASSISTANT
Payer: COMMERCIAL

## 2024-04-24 DIAGNOSIS — G89.29 OTHER CHRONIC PAIN: ICD-10-CM

## 2024-04-24 DIAGNOSIS — R53.83 OTHER FATIGUE: ICD-10-CM

## 2024-04-24 LAB
DSDNA IGG SERPL IA-ACNC: <0.6 IU/ML
ENA AB SER QL IA: 0.3 UG/L
ENA AB SER QL IA: NEGATIVE
SEX HORM BIND GLOB: 15.5 NMOL/L
TESTOST % FREE+WEAK BND: 19.9 %
TESTOST FREE+WEAK BND: 28.3 NG/DL
TESTOSTERONE TOT /MS: 142 NG/DL
VITAMIN B6: 24.3 UG/L

## 2024-04-24 PROCEDURE — 84255 ASSAY OF SELENIUM: CPT | Performed by: PHYSICIAN ASSISTANT

## 2024-04-24 PROCEDURE — 82525 ASSAY OF COPPER: CPT | Performed by: PHYSICIAN ASSISTANT

## 2024-04-24 PROCEDURE — 84630 ASSAY OF ZINC: CPT | Performed by: PHYSICIAN ASSISTANT

## 2024-04-24 RX ORDER — PREGABALIN 75 MG/1
75 CAPSULE ORAL 2 TIMES DAILY PRN
Qty: 60 CAPSULE | Refills: 0 | Status: SHIPPED | OUTPATIENT
Start: 2024-04-24

## 2024-04-25 LAB — REVERSE T3: 20.1 NG/DL

## 2024-04-26 LAB — SELENIUM LVL: 155 UG/L

## 2024-04-27 LAB
COPPER: 63 UG/DL
ZINC: 75 UG/DL

## 2024-05-07 NOTE — PROGRESS NOTES
Bhavesh Anderson is a 63 year old male.  No chief complaint on file.      HPI:   Bhavesh presents for follow up,     Updates from last visit:   He is feeling better with acupuncture in the last week     Acupuncturist recommended this   NAC  ALA  B complex     He is weaning off the lexapro - he feels like it raises his anxiety 7.5mg today     Thyroid: T3 is low     Body/skin: Muscle pain is present but improved in the last week     Mental State: He is struggling with anxiety     Hormones - testosterone is low       GI - He is having less muscle pain. He is not sure if it is associated with his stomach or his muscles      Sleep - taking benadryl for sleep. Is sometimes only getting 2 hours     Supplements:  Probiotic  Omega 3       HPI's FROM PREVIOUS VISITS        ALLERGIES     Allergies   Allergen Reactions    Atorvastatin MYALGIA        CURRENT MEDICATIONS:     Current Outpatient Medications   Medication Sig Dispense Refill    Testosterone (ANDROGEL PUMP) 20.25 MG/ACT (1.62%) Transdermal Gel Place 40.5 mg onto the skin daily. 40 g 0    escitalopram (LEXAPRO) 10 MG Oral Tab Take 1 tablet (10 mg total) by mouth daily. 30 tablet 2    diazePAM 5 MG Oral Tab Take 1 tablet (5 mg total) by mouth every 12 (twelve) hours as needed for Anxiety or Sleep. 60 tablet 0    losartan 25 MG Oral Tab Take 1 tablet (25 mg total) by mouth daily.      metoprolol tartrate 25 MG Oral Tab Take 1 tablet (25 mg total) by mouth 2 (two) times daily.      clopidogrel 75 MG Oral Tab Take 1 tablet (75 mg total) by mouth daily.      aspirin (ASPIRIN 81) 81 MG Oral Tab EC Take 1 tablet (81 mg total) by mouth daily.  0       MEDICAL HISTORY:     Past Medical History:    Atherosclerosis of coronary artery    Cervical spondylosis    Diverticulosis    Duodenitis    Erosive    Epigastric pain    Fibromyalgia    Gastric diverticulum    Gastritis    Heterozygous MTHFR mutation C677T    HTN (hypertension)    Hyperlipidemia    Hypogonadism male     Insomnia    Irritable bowel syndrome    Lumbago    Myocardial infarction (HCC)    3 stents    Screening for iron deficiency anemia    Screening for lipoid disorders    Screening for other and unspecified endocrine, nutritional, metabolic, and immunity disorders    Metabolic Disorders    Screening for thyroid disorder    Special screening for malignant neoplasm of prostate    Thoracic disc herniation       SURGICAL HISTORY:     Past Surgical History:   Procedure Laterality Date    Colonoscopy 2015    Fluor gid & loclzj ndl/cath spi dx/ther njx  07/09/2012    Procedure: THORACIC EPIDURAL;  Surgeon: Heron Ramirez MD;  [place] [facility]    Fluor gid & loclzj ndl/cath spi dx/ther njx  07/24/2012    Procedure: TRANSFORAMINAL EPIDURAL - CERVICAL;  Surgeon: Khai Iniguez MD;  [place] [facility]    Fluor gid & loclzj ndl/cath spi dx/ther njx  10/23/2014    Procedure: ;  Surgeon: Khai Iniguez MD;  [place]: [facility]    Fluor gid & loclzj ndl/cath spi dx/ther njx N/A 11/03/2014    Procedure: CERVICAL EPIDURAL;  Surgeon: Khai Iniguez MD;  [place] [facility]    Fluor gid & loclzj ndl/cath spi dx/ther njx N/A 11/24/2014    Procedure: CERVICAL EPIDURAL;  Surgeon: Khai Iniguez MD;  [place]: [facility]    Fluor gid & loclzj ndl/cath spi dx/ther njx N/A 02/27/2015    Procedure: THORACIC EPIDURAL;  Surgeon: Khai Iniguez MD;  [place]: [facility]    Fluor gid & loclzj ndl/cath spi dx/ther njx N/A 03/13/2015    Procedure: THORACIC EPIDURAL;  Surgeon: Heron Ramirez MD;  [place]: [facility] [place] [facility]    Fluor gid & loclzj ndl/cath spi dx/ther njx N/A 03/27/2015    Procedure: THORACIC EPIDURAL;  Surgeon: Khai Iniguez MD;  [place]: [facility] [place] [facility]    Injection, w/wo contrast, dx/therapeutic substance, epidural/subarachnoid; cervical/thoracic  07/09/2012     Procedure: THORACIC EPIDURAL;  Surgeon: Heron Ramirez MD;  [place] [facility]    Injection, w/wo contrast, dx/therapeutic substance, epidural/subarachnoid; cervical/thoracic  07/24/2012    Procedure: TRANSFORAMINAL EPIDURAL - CERVICAL;  Surgeon: Khai Iniguez MD;  [place] [facility]    Injection, w/wo contrast, dx/therapeutic substance, epidural/subarachnoid; cervical/thoracic N/A 10/23/2014    Procedure: CERVICAL EPIDURAL;  Surgeon: Khai Iniguez MD;  [place] [facility]    Injection, w/wo contrast, dx/therapeutic substance, epidural/subarachnoid; cervical/thoracic N/A 11/03/2014    Procedure: CERVICAL EPIDURAL;  Surgeon: Khai Iniguez MD;  [place] [facility]    Injection, w/wo contrast, dx/therapeutic substance, epidural/subarachnoid; cervical/thoracic N/A 11/24/2014    Procedure: CERVICAL EPIDURAL;  Surgeon: Khai Iniguez MD;  [place]: [facility]    Injection, w/wo contrast, dx/therapeutic substance, epidural/subarachnoid; cervical/thoracic N/A 02/27/2015    Procedure: THORACIC EPIDURAL;  Surgeon: Khai Iniguez MD;  [place]: [facility]    Injection, w/wo contrast, dx/therapeutic substance, epidural/subarachnoid; cervical/thoracic N/A 03/13/2015    Procedure: THORACIC EPIDURAL;  Surgeon: Heron Ramirez MD;  [place]: [facility]    Injection, w/wo contrast, dx/therapeutic substance, epidural/subarachnoid; cervical/thoracic N/A 03/27/2015    Procedure: THORACIC EPIDURAL;  Surgeon: Khai Iniguez MD;  [place]: [facility]    M-sedaj by  phys perfrmg svc 5+ yr  07/09/2012    Procedure: THORACIC EPIDURAL;  Surgeon: Heron Ramirez MD;  [place]: [facility]    M-sedaj by  phys perfrmg svc 5+ yr  10/23/2014    Procedure: ;  Surgeon: Khai Iniguez MD;  [place]: [facility]    M-sedaj by   phys perfrmg svc 5+ yr N/A 11/03/2014    Procedure: CERVICAL EPIDURAL;  Surgeon: Khai Iniguez MD;  Location: [facility]    M-sedaj by  phys perfrmg svc 5+ yr N/A 11/24/2014    Procedure: CERVICAL EPIDURAL;  Surgeon: Khai Iniguez MD;  Location: [facility]    M-sedaj by  phys perfrmg svc 5+ yr N/A 02/27/2015    Procedure: THORACIC EPIDURAL;  Surgeon: Khai Iniguez MD;  Location: [facility]    M-sedaj by  phys perfrmg svc 5+ yr N/A 03/13/2015    Procedure: THORACIC EPIDURAL;  Surgeon: Heron Ramirez MD;  Location: [facility]    M-sedaj by  phys perfrmg svc 5+ yr N/A 03/27/2015    Procedure: THORACIC EPIDURAL;  Surgeon: Khai Iniguez MD;  Location: [facility]    Upper gi endoscopy,biopsy  05/10/2012       FAMILY HISTORY:      Family History   Problem Relation Age of Onset    Other (Diabetes Mellitus[other]) Mother     Cancer Mother         Lung cancer    Cancer Father         Lung cancer    Heart Disease Father     Other (Gastric ca[other]) Other         fam hx       SOCIAL HISTORY:     Social History     Socioeconomic History    Marital status:    Tobacco Use    Smoking status: Never    Smokeless tobacco: Never   Vaping Use    Vaping status: Never Used   Substance and Sexual Activity    Alcohol use: Yes     Alcohol/week: 6.0 standard drinks of alcohol     Types: 6 Cans of beer per week     Comment: social    Drug use: No   Other Topics Concern    Caffeine Concern Yes    Stress Concern No    Weight Concern No    Special Diet No    Exercise Yes    Seat Belt Yes       REVIEW OF SYSTEMS:   Review of Systems     See HPI for pertinent positives and negatives     PHYSICAL EXAM:   There were no vitals filed for this visit.    Physical Exam     Physical Exam  Constitutional:       Appearance: Normal appearance.   Neurological:      General: No focal deficit present.      Mental Status: She is alert and  oriented to person, place, and time.   Psychiatric:         Mood and Affect: Mood normal.    ASSESSMENT AND PLAN:     For micronutrient deficiency start phytomulti    Add testosterone     We will consider GI testing in the future if we continue to have inability to absorb       1. Low testosterone in male  - Testosterone (ANDROGEL PUMP) 20.25 MG/ACT (1.62%) Transdermal Gel; Place 40.5 mg onto the skin daily.  Dispense: 40 g; Refill: 0    2. Low serum copper level    3. Low folate      Time spent with patient: Over 30 minutes spent in chart review and in direct communication with patient obtaining and reviewing history, creating a unique care plan, explaining the rationale for treatment, reviewing potential SE and overall treatment plan,  documenting all clinical information in Epic. Over 50% of this time was in education, counseling and coordination of care.     Problem List Items Addressed This Visit    None  Visit Diagnoses       Low testosterone in male    -  Primary    Relevant Medications    Testosterone (ANDROGEL PUMP) 20.25 MG/ACT (1.62%) Transdermal Gel    Low serum copper level        Low folate        Relevant Medications    Testosterone (ANDROGEL PUMP) 20.25 MG/ACT (1.62%) Transdermal Gel             Orders Placed This Visit:  No orders of the defined types were placed in this encounter.    No orders of the defined types were placed in this encounter.      Patient Instructions       PhytoMulti® takes you beyond basic wellness support. It has a proprietary blend of 13 concentrated extracts and phytonutrients with scientifically tested biological activity to support cellular health and overall wellness.*    Suggested Use:    Take one to two tablets once daily with food or as directed by your healthcare practitioner.    The following website can be utilized to purchase supplements.     [email or web address]     The nutrients I need replaced are all in the above supplement  Future we  may add NAC and ALA but hold off for now     Testosterone was sent to the pharmacy     I have canceled specialized testing     Future we may consider fecal fat, calprotectin and/or GI effects if there is concern with absorption       Return in about 6 weeks (around 6/18/2024) for 60.    Patient affirmed understanding of plan and all questions were answered.     Carolina Hand PA-C

## 2024-05-07 NOTE — PATIENT INSTRUCTIONS
PhytoMulti® takes you beyond basic wellness support. It has a proprietary blend of 13 concentrated extracts and phytonutrients with scientifically tested biological activity to support cellular health and overall wellness.*    Suggested Use:    Take one to two tablets once daily with food or as directed by your healthcare practitioner.    The following website can be utilized to purchase supplements.     https://[email or web address]/[email or web address]     The nutrients I need replaced are all in the above supplement  Future we may add NAC and ALA but hold off for now     Testosterone was sent to the pharmacy     I have canceled specialized testing     Future we may consider fecal fat, calprotectin and/or GI effects if there is concern with absorption

## 2024-08-14 NOTE — TELEPHONE ENCOUNTER
Patient has upcoming appointment on 9/25  Asking if he needs any lab work prior to appointment  LOV 4/26/24    Please advise

## 2024-08-14 NOTE — TELEPHONE ENCOUNTER
Pt wondering if he has any labs that need to get done prior to follow up appt on 09/25/24.       If new labs are due pt will like a call so that he may come in for labs and go over results at appt time.

## 2024-08-15 NOTE — PATIENT INSTRUCTIONS
Current plan-discuss with your cardiologist metoprolol usage. .  You stated that the coldness in your feet seems to be aggravated by metoprolol.  Metoprolol is a beta-blocker and it is possible that it might affect your circulation.  Your cold feet might be a sign of Raynaud's phenomena you might benefit from Procardia XL 30 mg a day which is a vasodilator used for Raynaud's.  The idea is that the Procardia would improve blood flow to the feet and your pain and numbness and discomfort in her feet might be helped by Procardia.  So consider use of Procardia.  Since you have had a heart attack and had to have stents it seems reasonable that you should be on a statin medication.  Lovastatin generally has very little muscle irritation that would be a good cholesterol blocker to try.  Your aches and pains have improved by taking your protein supplement, eating more protein and exercising regularly.  If your aches and pains seem to come back, if the fibromyalgia flares, these are medications you could take - Lyrica, gabapentin, or Cymbalta.  You have used these in the past.  It iss basically trial and error to see which  works the best.  For now fibromyalgia medication was not prescribed.  You certainly can take an occasional extra strength Tylenol.  Tylenol does not help you could take an occasional ibuprofen.  Return to my office for any flareup as needed.  Her next appointments are with your primary and with your cardiologist.  Dr. Paniagua.

## 2024-08-15 NOTE — PROGRESS NOTES
[facility]  Report of Consultation    Bhavesh Anderson Patient Status:  No patient class for patient encounter    1960 MRN [id number]   Location [facility] PCP Campbell Giles MD     Date of Consult:  8/15/24     Reason for Consultation:   Chief Complaint   Patient presents with    New Patient     New patient referred by his PCP. Rapid 3 score is a 4.7.       History of Present Illness: Bhavesh Anderson is a a(n) 63 year old male.   Since January, increased aches.  Feet feel clunky.  Feet numb, feel heavy.  Forces himself to walk.  Feet get cold,  At night the feet will hurt enough to keep him awake.  More irritation towards the toes.  Feet are stiff too.       Last , heart attack, needed 3 stents.  Did good for awhile then in January aches and pain started. History of Fibromyalgia 10 years ago.  Was taken off a statin last winter, but that did not improve his aches and pains.         10 years ago had an attack of fibromyalgia and took a variety of medications.           It was recommended to take a protein supplement and exercise. .          Since taking the protein supplement and exercising more, everything is better but the feet.  Feet are very annoying and cold. On a blood thinner.             10 years ago treated with Lyrica, Gabapentin and cymbalta for Fibromyalgia.            Hands hurt mildly but no swelling.    No foot swelling .  No skin rash.    Trouble sleeping at night.  Some fatigue.             History:  PMH:       Fibromyalgia                  Cervical Spondylosis                  Hypertension                  Hyperlipidemia                  CAD  PSH:       3 Heart stents 2023  Thoracic back injections.   FAMHX:   Mother  age 83 - diabetes and lung cancer.     Father  age 74, heart disease and lung cancer.   SOCHX:   , part time ,  Non smoker.  Occasional beer  - 6-8 per week.  One son Family doctor in [facility].      Allergies:   Allergies   Allergen Reactions    Atorvastatin MYALGIA       Medications:   Current Outpatient Medications:     NIFEdipine ER (PROCARDIA XL) 30 MG Oral Tablet 24 Hr, Take 1 tablet (30 mg total) by mouth daily., Disp: 30 tablet, Rfl: 3    losartan 25 MG Oral Tab, Take 1 tablet (25 mg total) by mouth daily., Disp: , Rfl:     metoprolol tartrate 25 MG Oral Tab, Take 1 tablet (25 mg total) by mouth 2 (two) times daily., Disp: , Rfl:     aspirin (ASPIRIN 81) 81 MG Oral Tab EC, Take 1 tablet (81 mg total) by mouth daily., Disp: , Rfl: 0    Review of Systems:   No recent fever or chill.  After the heart attack last year lost 50 pounds, now gained 25 pounds back..  No current chest pain, shortness of breath, or palpitations.  No current abdominal pain, nausea, diarrhea, or vomiting.  No difficulty with urination. No blood in the stool or blood in urine.  No current difficulty with vision or hearing.  Cataract right eye.   Joint pain/joint swelling-see HPI. No muscle pain. No leg swelling.  No joint swelling.  No skin rashes.    Physical Exam:/72   Pulse 60   Temp 97.3 °F (36.3 °C)   Resp 16   Ht 6' 2\" (1.88 m)   Wt 224 lb 1.9 oz (101.7 kg)   SpO2 97%   BMI 28.78 kg/m²    Well developed man in NAD.   HEENT exam within normal limits. Nonicteric sclera. Conjunctiva normal.    Neck supple without thyromegaly, no lymphadenopathy.   Lungs are clear to auscultation and percussion.    Heart: normal sinus rhythm without murmur.    Abdomen: soft, nontender, no masses, no organomegaly.    Extremities without any cyanosis, clubbing, or edema.  Normal upper and lower extremities.  Feet unremarkable.   Skin: Within normal limits.    Laboratory Data: 3/7/24 CK  37  Cholesterol 133 triglycerides 129 HDL 49  LDL 58 done at [facility].    5/9/24  Nuclear Medicine stress test  - normal perfusion study.  Non diagnostic stress EKG for ST segment  depression.   No chest [ain during stress tet.  Exercised 11:20 minutes       Imagin/15/24  Xray thoracic spine  normal.     Impression: 63-year-old man with significant complaints of aches and pains in his body which have actually improved, but now left foot residual numbness and discomfort in his feet.  His feet also feel cold at night.  This interferes with sleep.       My main diagnosis is fibromyalgia.  Regarding his feet he might have a small fiber neuropathy.  No sign of autoimmune disease.    1. Numbness of foot    2. Fibromyalgia    3. Coronary artery disease involving native coronary artery of native heart without angina pectoris        Recommendations:   Patient Instructions   Current plan-discuss with your cardiologist metoprolol usage. .  You stated that the coldness in your feet seems to be aggravated by metoprolol.  Metoprolol is a beta-blocker and it is possible that it might affect your circulation.  Your cold feet might be a sign of Raynaud's phenomena you might benefit from Procardia XL 30 mg a day which is a vasodilator used for Raynaud's.  The idea is that the Procardia would improve blood flow to the feet and your pain and numbness and discomfort in her feet might be helped by Procardia.  So consider use of Procardia.  Since you have had a heart attack and had to have stents it seems reasonable that you should be on a statin medication.  Lovastatin generally has very little muscle irritation that would be a good cholesterol blocker to try.  Your aches and pains have improved by taking your protein supplement, eating more protein and exercising regularly.  If your aches and pains seem to come back, if the fibromyalgia flares, these are medications you could take - Lyrica, gabapentin, or Cymbalta.  You have used these in the past.  It iss basically trial and error to see which  works the best.  For now fibromyalgia medication was not prescribed.  You certainly can take an occasional extra  strength Tylenol.  Tylenol does not help you could take an occasional ibuprofen.  Return to my office for any flareup as needed.  Her next appointments are with your primary and with your cardiologist.  Dr. Paniagua.     Thank you for allowing me to participate in the care of your patient.    Leonardo Paniagua MD  8/15/2024  11:00 AM

## 2024-09-25 NOTE — PATIENT INSTRUCTIONS
Health Screening Guidelines, Men Ages 50 to 64   Screening tests and health counseling are a key part of managing your health. A screening test is done to find disorders or diseases in people who don't have any symptoms. Screening tests are not used to diagnose. They are used to find out if more testing is needed. The goal may be to find a disease early so it can be treated with more success. Or the goal may be to find a disease early so you can make lifestyle changes. You may need regular checkups to help you reduce your risk of disease.   Below are guidelines for men ages 50 to 64. Talk with your healthcare provider. Make sure you’re up-to-date on what you need.   We understand gender is a spectrum. We may use gendered terms to talk about anatomy and health risk. Please use this information in a way that works best for you and your provider as you talk about your care.   Screening  Who needs it How often    Unhealthy alcohol use  All men in this age group  At routine exams   Blood pressure All men in this age group  Once a year if your blood pressure is normal. Normal blood pressure is less than 120/80 mm Hg. If your blood pressure is higher than this, follow the advice of your healthcare provider.    Colorectal cancer All men at average risk in this age group  Talk with your healthcare provider about which test below is right for you:   Colonoscopy every 10 years  Flexible sigmoidoscopy every 5 years (or every 10 years with yearly fecal immunochemical test (FIT) stool test)  CT colonography (virtual colonoscopy) every 5 years  Yearly fecal occult blood test  Yearly FIT  Stool DNA test every 1 to 3 years  If you have a test that is not a colonoscopy and have an abnormal test result, you will need a colonoscopy.   You may need to be screened more or less often. This is based on personal or family health history. Talk with your healthcare provider.    Depression All men in this age group  At routine exams   Type 2  diabetes or prediabetes  All in this age group  At least every 3 years (yearly if your blood sugar has already begun to rise)    Type 2 diabetes All men with prediabetes  Every year   Hepatitis C Men at higher risk for infection. Test 1 time for men born between 1945 and 1965.  Talk with your healthcare provider.    High cholesterol or triglycerides  All men in this age group  At least every 4 to 6 years. Talk with your healthcare provider about your risk. Ask if you should be tested more often.    HIV All men in this age group  At least 1 time. Talk with your healthcare provider about your risk factors. Ask if you should be tested more often.    Lung cancer All men in this age group who are in fairly good health and are at higher risk for lung cancer, and who:   Smoke or quit in the past 15 years  Have a 20-pack per year smoking history (1 pack a day for 20 years or 2 packs a day for 10 years)  Expert groups vary in their advice. Talk with your healthcare provider.  Yearly lung cancer screening with a low-dose CT scan (LDCT). Talk with your healthcare provider.    Obesity All men in this age group  At yearly routine exams    BMI (body mass index) All men in this age group Every year, to help find out if you are at a healthy weight for your height    Prostate cancer All men in this age group, talk with your healthcare provider about risks and benefits of a digital rectal exam (DIONICIO) and prostate-specific antigen (PSA) screening  At routine exams if you decide to be tested.    Syphilis Men at higher risk for infection  At routine exams. Talk with your healthcare provider.    Tuberculosis Men at higher risk for infection  Talk with your healthcare provider    Vision All men in this age group  Talk with your healthcare provider   Health counseling  Who needs it  How often    Diet and exercise Men who are overweight or obese  When diagnosed, and then at routine exams    Sexually transmitted infection (STI) prevention   Men at higher risk for infection  At routine exams; talk with your healthcare provider    Use of tobacco and the health effects it can cause  All men in this age group  Every exam   [place] last reviewed this educational content on 5/1/2021 © 2000-2023 [employer]. All rights reserved. This information is not intended as a substitute for professional medical care. Always follow your healthcare professional's instructions.

## 2024-09-25 NOTE — PROGRESS NOTES
HPI:   Bhavesh Anderson is a 63 year old male who presents for an Annual Health Visit.     Fibromyaglia - sx improving. Doing well overall. Has increased protein intake which he thinks helped. He has been exercising regularly. No longer taking any medications for pain/fibromyalgia. Denies any new concerns.     CAD - no anginal sx. Compliant with medications. Has upcoming cardiology follow up OV.     Sea sickness - requesting medication as he is going on to be a on boat in [place] in a few months.       Allergies:     Allergies   Allergen Reactions    Atorvastatin MYALGIA       CURRENT MEDICATIONS   Current Outpatient Medications   Medication Sig Dispense Refill    latanoprost 0.005 % Ophthalmic Solution Place 1 drop into both eyes nightly. TAKE AT BEDTIME      Scopolamine 1.5mg TD patch 1mg/3days Place 1 patch onto the skin every third day. 10 patch 0    ondansetron 4 MG Oral Tablet Dispersible Take 1 tablet (4 mg total) by mouth every 8 (eight) hours as needed for Nausea. 30 tablet 0    losartan 25 MG Oral Tab Take 1 tablet (25 mg total) by mouth daily.      metoprolol tartrate 25 MG Oral Tab Take 1 tablet (25 mg total) by mouth 2 (two) times daily.      aspirin (ASPIRIN 81) 81 MG Oral Tab EC Take 1 tablet (81 mg total) by mouth daily.  0    NIFEdipine ER (PROCARDIA XL) 30 MG Oral Tablet 24 Hr Take 1 tablet (30 mg total) by mouth daily. (Patient not taking: Reported on 9/25/2024) 30 tablet 3      HISTORICAL INFORMATION   Past Medical History:    Atherosclerosis of coronary artery    Cervical spondylosis    Diverticulosis    Duodenitis    Erosive    Epigastric pain    Fibromyalgia    Gastric diverticulum    Gastritis    Heterozygous MTHFR mutation C677T    HTN (hypertension)    Hyperlipidemia    Hypogonadism male    Insomnia    Irritable bowel syndrome    Lumbago    Myocardial infarction (HCC)    3 stents    Screening for iron deficiency anemia    Screening for lipoid disorders    Screening for other and  unspecified endocrine, nutritional, metabolic, and immunity disorders    Metabolic Disorders    Screening for thyroid disorder    Special screening for malignant neoplasm of prostate    Thoracic disc herniation      Past Surgical History:   Procedure Laterality Date    Colonoscopy 2015    Fluor gid & loclzj ndl/cath spi dx/ther njx  07/09/2012    Procedure: THORACIC EPIDURAL;  Surgeon: Heron Ramirez MD;  Location: [facility]    Fluor gid & loclzj ndl/cath spi dx/ther njx  07/24/2012    Procedure: TRANSFORAMINAL EPIDURAL - CERVICAL;  Surgeon: Khai Iniguez MD;  Location: [facility]    Fluor gid & loclzj ndl/cath spi dx/ther njx  10/23/2014    Procedure: ;  Surgeon: Khai Iniguez MD;  Location: [facility]    Fluor gid & loclzj ndl/cath spi dx/ther njx N/A 11/03/2014    Procedure: CERVICAL EPIDURAL;  Surgeon: Khai Iniguez MD;  Location: [facility]    Fluor gid & loclzj ndl/cath spi dx/ther njx N/A 11/24/2014    Procedure: CERVICAL EPIDURAL;  Surgeon: Khai Iniguez MD;  Location: [facility]    Fluor gid & loclzj ndl/cath spi dx/ther njx N/A 02/27/2015    Procedure: THORACIC EPIDURAL;  Surgeon: Khai Iniguez MD;  Location: [facility]    Fluor gid & loclzj ndl/cath spi dx/ther njx N/A 03/13/2015    Procedure: THORACIC EPIDURAL;  Surgeon: Heron Ramirez MD;  Location: [facility]    Fluor gid & loclzj ndl/cath spi dx/ther njx N/A 03/27/2015    Procedure: THORACIC EPIDURAL;  Surgeon: Khai Iniguez MD;  Location: [facility]    Injection, w/wo contrast, dx/therapeutic substance, epidural/subarachnoid; cervical/thoracic  07/09/2012    Procedure: THORACIC EPIDURAL;  Surgeon: Heron Ramirez MD;  Location: [facility]    Injection, w/wo contrast, dx/therapeutic substance, epidural/subarachnoid; cervical/thoracic  07/24/2012     Procedure: TRANSFORAMINAL EPIDURAL - CERVICAL;  Surgeon: Khai Iniguez MD;  [place]: [facility]    Injection, w/wo contrast, dx/therapeutic substance, epidural/subarachnoid; cervical/thoracic N/A 10/23/2014    Procedure: CERVICAL EPIDURAL;  Surgeon: Khai Iniguez MD;  Location: [facility]    Injection, w/wo contrast, dx/therapeutic substance, epidural/subarachnoid; cervical/thoracic N/A 11/03/2014    Procedure: CERVICAL EPIDURAL;  Surgeon: Khai Iniguez MD;  [facility]    Injection, w/wo contrast, dx/therapeutic substance, epidural/subarachnoid; cervical/thoracic N/A 11/24/2014    Procedure: CERVICAL EPIDURAL;  Surgeon: Khai Iniguez MD;  Location: [facility]    Injection, w/wo contrast, dx/therapeutic substance, epidural/subarachnoid; cervical/thoracic N/A 02/27/2015    Procedure: THORACIC EPIDURAL;  Surgeon: Khai Iniguez MD;  Location: [facility]    Injection, w/wo contrast, dx/therapeutic substance, epidural/subarachnoid; cervical/thoracic N/A 03/13/2015    Procedure: THORACIC EPIDURAL;  Surgeon: Heron Ramirez MD;  Location: [facility]    Injection, w/wo contrast, dx/therapeutic substance, epidural/subarachnoid; cervical/thoracic N/A 03/27/2015    Procedure: THORACIC EPIDURAL;  Surgeon: Khai Iniguez MD;  Location: [facility]    M-sedaj by  phys perfrmg svc 5+ yr  07/09/2012    Procedure: THORACIC EPIDURAL;  Surgeon: Heron Ramierz MD;  [facility]    M-sedaj by  phys perfrmg svc 5+ yr  10/23/2014    Procedure: ;  Surgeon: Khai Iniguez MD;  Location: [facility]    M-sedaj by  phys perfrmg svc 5+ yr N/A 11/03/2014    Procedure: CERVICAL EPIDURAL;  Surgeon: Khai Iniguez MD;  Location: [facility]    M-sedaj by  phys perfrmg svc 5+ yr N/A 11/24/2014    Procedure:  CERVICAL EPIDURAL;  Surgeon: Khai Iniguez MD;  [place] [facility]    M-sedaj by  phys perfrmg svc 5+ yr N/A 02/27/2015    Procedure: THORACIC EPIDURAL;  Surgeon: Khai Iniguez MD;  [facility]: [facility]    M-sedaj by  phys perfrmg svc 5+ yr N/A 03/13/2015    Procedure: THORACIC EPIDURAL;  Surgeon: Heron Ramirez MD;  Location: [facility]    M-sedaj by  phys perfrmg svc 5+ yr N/A 03/27/2015    Procedure: THORACIC EPIDURAL;  Surgeon: Khai Iniguez MD;  Location: [facility]    Upper gi endoscopy,biopsy  05/10/2012      Family History   Problem Relation Age of Onset    Other (Diabetes Mellitus[other]) Mother     Cancer Mother         Lung cancer    Cancer Father         Lung cancer    Heart Disease Father     Other (Gastric ca[other]) Other         fam hx      SOCIAL HISTORY   Social History     Socioeconomic History    Marital status:    Tobacco Use    Smoking status: Never    Smokeless tobacco: Never   Vaping Use    Vaping status: Never Used   Substance and Sexual Activity    Alcohol use: Yes     Alcohol/week: 6.0 standard drinks of alcohol     Types: 6 Cans of beer per week     Comment: social    Drug use: No   Other Topics Concern    Caffeine Concern Yes    Stress Concern No    Weight Concern No    Special Diet No    Exercise Yes    Seat Belt Yes     Social Determinants of Health      Received from [facility] Short Social Needs Screening - Social Connection     Social History     Social History Narrative    Not on file        REVIEW OF SYSTEMS:     Constitutional: negative  Eyes: negative  ENT: negative  Respiratory: negative  Cardiovascular: negative  Gastrointestinal: negative  Integument/Breast: negative  Genitourinary: negative  Heme/Lymph: negative  Musculoskeletal: negative  Neurological: negative  Psych: negative  Endocrine: negative  Allergic/Immune: negative    EXAM:   /70   Pulse 69    Ht 6' 1\" (1.854 m)   Wt 200 lb (90.7 kg)   SpO2 94%   BMI 26.39 kg/m²    Wt Readings from Last 6 Encounters:   09/25/24 200 lb (90.7 kg)   08/15/24 224 lb 1.9 oz (101.7 kg)   04/26/24 201 lb (91.2 kg)   04/19/24 202 lb 9.6 oz (91.9 kg)   04/05/24 207 lb (93.9 kg)   10/11/23 212 lb (96.2 kg)     Body mass index is 26.39 kg/m².    General: alert, appears stated age, and cooperative  Head: Normocephalic, without obvious abnormality, atraumatic  Eyes: negative  Ears: normal TM's and external ear canals both ears  Nose: Nares normal. Septum midline. Mucosa normal. No drainage or sinus tenderness.  Throat: lips, mucosa, and tongue normal; teeth and gums normal  Neck: no adenopathy, supple, symmetrical, trachea midline, and thyroid not enlarged, symmetric, no tenderness/mass/nodules  Heart: S1, S2 normal, no murmur, click, rub or gallop, regular rate and rhythm  Lungs: clear to auscultation bilaterally  Chest wall: no tenderness  Abdomen: soft, non-tender; bowel sounds normal; no masses,  no organomegaly  : deferred  Back: negative  Extremities: extremities normal, atraumatic, no cyanosis or edema  Pulses: 2+ and symmetric  Skin: Skin color, texture, turgor normal. No rashes or lesions  Lymph Nodes:  No LAD  Neurologic: Grossly normal    ASSESSMENT AND PLAN:   Bhavesh was seen today for physical.    Diagnoses and all orders for this visit:    Wellness examination  -Immunizations: UTD - declines prevnar today.   -Metabolic: BMI 26. BP wnl. Due for annual labs   -Cancer screening: due for CRC screening.   -Communicable disease: low risk   -Mental health: no concerns   -Other preventative: follow with dentistry and optometry.   -Lifestyle: Follow a well balanced healthy diet with emphasis on fruits, vegetables, whole grains, lean meats. Limit processed and junk foods. Aim for at least 150 minutes of moderate intensity exercise weekly. Make sure you are staying adequately hydrated. Aim to get 7-9 hours of sleep nightly.      Colon cancer screening  -     Surgery Referral - In Network    Laboratory examination ordered as part of a routine general medical examination  -     CBC With Differential With Platelet; Future  -     Comp Metabolic Panel (14); Future  -     Lipid Panel; Future  -     TSH and Free T4; Future  -     PSA Total, Screen; Future    Sea sickness, initial encounter  Use scoplamine patch as needed for sea sickness. Can use zofran for severe refractory symptoms.   -     Scopolamine 1.5mg TD patch 1mg/3days; Place 1 patch onto the skin every third day.  -     ondansetron 4 MG Oral Tablet Dispersible; Take 1 tablet (4 mg total) by mouth every 8 (eight) hours as needed for Nausea.    Coronary artery disease involving native coronary artery of native heart without angina pectoris  Stable, asymptomatic. CPM. F/u with cardiology    Primary hypertension  Stable, CPM. Continue low sodium diet, regular exercise.     Pure hypercholesterolemia  Will discuss w/ cardiology about restarting statin. Continue low fat diet, regular exercise.     Prediabetes  Recheck A1c. Continue low carb diet, regular exercise.   -     Hemoglobin A1C [E]; Future    Immunity status testing  Has received Hep B vaccine in 2015, will check HbsAb levels for immunity.   -     Hepatitis B Surface Antibody [E]; Future    Fibromyalgia  Stable currently, continue present mgmt.   Has been following with rheum, recently seen on 08/15. Rheum thinks possibly small fiber neuropathy in feet. His neuropathy sx are stable/improving. Continue to monitor. Check A1c levels. Consider NCV study    Patient Instructions     Health Screening Guidelines, Men Ages 50 to 64   Screening tests and health counseling are a key part of managing your health. A screening test is done to find disorders or diseases in people who don't have any symptoms. Screening tests are not used to diagnose. They are used to find out if more testing is needed. The goal may be to find a disease early so  it can be treated with more success. Or the goal may be to find a disease early so you can make lifestyle changes. You may need regular checkups to help you reduce your risk of disease.   Below are guidelines for men ages 50 to 64. Talk with your healthcare provider. Make sure you’re up-to-date on what you need.   We understand gender is a spectrum. We may use gendered terms to talk about anatomy and health risk. Please use this information in a way that works best for you and your provider as you talk about your care.   Screening  Who needs it How often    Unhealthy alcohol use  All men in this age group  At routine exams   Blood pressure All men in this age group  Once a year if your blood pressure is normal. Normal blood pressure is less than 120/80 mm Hg. If your blood pressure is higher than this, follow the advice of your healthcare provider.    Colorectal cancer All men at average risk in this age group  Talk with your healthcare provider about which test below is right for you:   Colonoscopy every 10 years  Flexible sigmoidoscopy every 5 years (or every 10 years with yearly fecal immunochemical test (FIT) stool test)  CT colonography (virtual colonoscopy) every 5 years  Yearly fecal occult blood test  Yearly FIT  Stool DNA test every 1 to 3 years  If you have a test that is not a colonoscopy and have an abnormal test result, you will need a colonoscopy.   You may need to be screened more or less often. This is based on personal or family health history. Talk with your healthcare provider.    Depression All men in this age group  At routine exams   Type 2 diabetes or prediabetes  All in this age group  At least every 3 years (yearly if your blood sugar has already begun to rise)    Type 2 diabetes All men with prediabetes  Every year   Hepatitis C Men at higher risk for infection. Test 1 time for men born between 1945 and 1965.  Talk with your healthcare provider.    High cholesterol or triglycerides  All men  in this age group  At least every 4 to 6 years. Talk with your healthcare provider about your risk. Ask if you should be tested more often.    HIV All men in this age group  At least 1 time. Talk with your healthcare provider about your risk factors. Ask if you should be tested more often.    Lung cancer All men in this age group who are in fairly good health and are at higher risk for lung cancer, and who:   Smoke or quit in the past 15 years  Have a 20-pack per year smoking history (1 pack a day for 20 years or 2 packs a day for 10 years)  Expert groups vary in their advice. Talk with your healthcare provider.  Yearly lung cancer screening with a low-dose CT scan (LDCT). Talk with your healthcare provider.    Obesity All men in this age group  At yearly routine exams    BMI (body mass index) All men in this age group Every year, to help find out if you are at a healthy weight for your height    Prostate cancer All men in this age group, talk with your healthcare provider about risks and benefits of a digital rectal exam (DIONICOI) and prostate-specific antigen (PSA) screening  At routine exams if you decide to be tested.    Syphilis Men at higher risk for infection  At routine exams. Talk with your healthcare provider.    Tuberculosis Men at higher risk for infection  Talk with your healthcare provider    Vision All men in this age group  Talk with your healthcare provider   Health counseling  Who needs it  How often    Diet and exercise Men who are overweight or obese  When diagnosed, and then at routine exams    Sexually transmitted infection (STI) prevention  Men at higher risk for infection  At routine exams; talk with your healthcare provider    Use of tobacco and the health effects it can cause  All men in this age group  Every exam   [employer] last reviewed this educational content on 5/1/2021 © 2000-2023 [employer]. All rights reserved. This information is not intended as a substitute for  professional medical care. Always follow your healthcare professional's instructions.          The patient indicates understanding of these issues and agrees to the plan.    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    Insomnia    Hypogonadism male    Gastric ulcer, unspecified as acute or chronic, without mention of hemorrhage, perforation, or obstruction    Irritable bowel syndrome    Thoracic disc herniation    Low back pain    HTN (hypertension)    Anxiety    Cervical spondylosis    Myalgia and myositis    Hyperlipidemia    Muscle spasm    Fatty liver    Prediabetes    Bulge of thoracic disc without myelopathy    Thoracic back pain    Anemia    Heterozygous MTHFR mutation C677T    Fissure in ano    Numbness of foot    Fibromyalgia    Coronary artery disease involving native coronary artery of native heart without angina pectoris       Campbell Giles MD  9/25/2024  8:51 AM

## 2024-11-07 NOTE — H&P
New Patient Visit Note       Active Problems      1. Encounter for screening colonoscopy        Chief Complaint   Chief Complaint   Patient presents with    Colonoscopy     NP- Cscope Consult- last cscope in 2012, denies family hx of colon cancer, denies symptoms        History of Present Illness   This is a very nice 63-year-old gentleman referred to me for a screening colonoscopy.  Patient's most recent colonoscopy was performed in 2012.  Patient does not believe there were any polyps or significant pathology discovered during this colonoscopy.  Patient denies any rectal bleeding, change in bowel habits, anemia or unintentional weight loss.  No family history of colon or rectal cancer.  Patient does take 81 mg of aspirin daily and has a history of cardiac stents.      Allergies  Bhavesh has no active allergies.    Past Medical / Surgical / Social / Family History    The past medical and past surgical history have been reviewed by me today.    Past Medical History:    Atherosclerosis of coronary artery    Cervical spondylosis    Diverticulosis    Duodenitis    Erosive    Epigastric pain    Fibromyalgia    Gastric diverticulum    Gastritis    Heterozygous MTHFR mutation C677T    HTN (hypertension)    Hyperlipidemia    Hypogonadism male    Insomnia    Irritable bowel syndrome    Lumbago    Myocardial infarction (HCC)    3 stents    Screening for iron deficiency anemia    Screening for lipoid disorders    Screening for other and unspecified endocrine, nutritional, metabolic, and immunity disorders    Metabolic Disorders    Screening for thyroid disorder    Special screening for malignant neoplasm of prostate    Thoracic disc herniation     Past Surgical History:   Procedure Laterality Date    Colonoscopy  2015    Fluor gid & loclzj ndl/cath spi dx/ther njx  07/09/2012    Procedure: THORACIC EPIDURAL;  Surgeon: Heron Ramirez MD;  [facility]: [facility]    Fluor gid & loclzj ndl/cath spi dx/ther njx   07/24/2012    Procedure: TRANSFORAMINAL EPIDURAL - CERVICAL;  Surgeon: Khai Iniguez MD;  [facility]    Fluor gid & loclzj ndl/cath spi dx/ther njx  10/23/2014    Procedure: ;  Surgeon: Khai Iniguez MD;  [facility]    Fluor gid & loclzj ndl/cath spi dx/ther njx N/A 11/03/2014    Procedure: CERVICAL EPIDURAL;  Surgeon: Khai Iniguez MD;  [facility]    Fluor gid & loclzj ndl/cath spi dx/ther njx N/A 11/24/2014    Procedure: CERVICAL EPIDURAL;  Surgeon: Khai Iniguez MD;  [facility]    Fluor gid & loclzj ndl/cath spi dx/ther njx N/A 02/27/2015    Procedure: THORACIC EPIDURAL;  Surgeon: Khai Iniguez MD;  [facility]: [facility]    Fluor gid & loclzj ndl/cath spi dx/ther njx N/A 03/13/2015    Procedure: THORACIC EPIDURAL;  Surgeon: Heron Ramirez MD;  [facility]    Fluor gid & loclzj ndl/cath spi dx/ther njx N/A 03/27/2015    Procedure: THORACIC EPIDURAL;  Surgeon: Khai Iniguez MD;  Location: [facility]    Injection, w/wo contrast, dx/therapeutic substance, epidural/subarachnoid; cervical/thoracic  07/09/2012    Procedure: THORACIC EPIDURAL;  Surgeon: Heron Ramirez MD;  Location: [facility]    Injection, w/wo contrast, dx/therapeutic substance, epidural/subarachnoid; cervical/thoracic  07/24/2012    Procedure: TRANSFORAMINAL EPIDURAL - CERVICAL;  Surgeon: Khai Iniguez MD;  Location: [facility]    Injection, w/wo contrast, dx/therapeutic substance, epidural/subarachnoid; cervical/thoracic N/A 10/23/2014    Procedure: CERVICAL EPIDURAL;  Surgeon: Khai Iniguez MD;  Location: [facility]    Injection, w/wo contrast, dx/therapeutic substance, epidural/subarachnoid; cervical/thoracic N/A 11/03/2014    Procedure: CERVICAL EPIDURAL;  Surgeon: Khai Iniguez,  MD;  [facility]    Injection, w/wo contrast, dx/therapeutic substance, epidural/subarachnoid; cervical/thoracic N/A 11/24/2014    Procedure: CERVICAL EPIDURAL;  Surgeon: Khai Iniguez MD;  Location: [facility] [place]    Injection, w/wo contrast, dx/therapeutic substance, epidural/subarachnoid; cervical/thoracic N/A 02/27/2015    Procedure: THORACIC EPIDURAL;  Surgeon: Khai Iniguez MD;  Location: [facility]    Injection, w/wo contrast, dx/therapeutic substance, epidural/subarachnoid; cervical/thoracic N/A 03/13/2015    Procedure: THORACIC EPIDURAL;  Surgeon: Heron Ramirez MD;  Location: [facility]    Injection, w/wo contrast, dx/therapeutic substance, epidural/subarachnoid; cervical/thoracic N/A 03/27/2015    Procedure: THORACIC EPIDURAL;  Surgeon: Khai Iniguez MD;  Location: [facility]    M-sedaj by  phys perfrmg svc 5+ yr  07/09/2012    Procedure: THORACIC EPIDURAL;  Surgeon: Heron Ramirez MD;  Location: [facility]    M-sedaj by  phys perfrmg svc 5+ yr  10/23/2014    Procedure: ;  Surgeon: Khai Iniguez MD;  Location: [facility]    M-sedaj by  phys perfrmg svc 5+ yr N/A 11/03/2014    Procedure: CERVICAL EPIDURAL;  Surgeon: Khai Iniguez MD;  Location: [facility]    M-sedaj by  phys perfrmg svc 5+ yr N/A 11/24/2014    Procedure: CERVICAL EPIDURAL;  Surgeon: Khai Iniguez MD;  Location: [facility]    M-sedaj by  phys perfrmg svc 5+ yr N/A 02/27/2015    Procedure: THORACIC EPIDURAL;  Surgeon: Khai Iniguez MD;  Location: [facility]    M-sedaj by  phys perfrmg svc 5+ yr N/A 03/13/2015    Procedure: THORACIC EPIDURAL;  Surgeon: Heron Ramirez MD;  Location: [facility]    M-sedaj by  phys perfrmg svc 5+ yr N/A 03/27/2015    Procedure: THORACIC EPIDURAL;   Surgeon: Khai Iniguez MD;  [place] [facility] [place] [facility]    Upper gi endoscopy,biopsy  05/10/2012       The family history and social history have been reviewed by me today.    Family History   Problem Relation Age of Onset    Other (Diabetes Mellitus[other]) Mother     Cancer Mother         Lung cancer    Cancer Father         Lung cancer    Heart Disease Father     Other (Gastric ca[other]) Other         fam hx     Social History     Socioeconomic History    Marital status:    Tobacco Use    Smoking status: Never    Smokeless tobacco: Never   Vaping Use    Vaping status: Never Used   Substance and Sexual Activity    Alcohol use: Yes     Alcohol/week: 6.0 standard drinks of alcohol     Types: 6 Cans of beer per week     Comment: social    Drug use: No   Other Topics Concern    Caffeine Concern Yes    Stress Concern No    Weight Concern No    Special Diet No    Exercise Yes    Seat Belt Yes        Current Outpatient Medications:     atorvastatin 40 MG Oral Tab, Take 1 tablet (40 mg total) by mouth daily., Disp: , Rfl:     PEG 3350-KCl-Na Bicarb-NaCl (TRILYTE) 420 g Oral Recon Soln, Starting at 4:00 pm the night before procedure, drink 8 ounces of the prep every 15-20 minutes until finished, Disp: 1 each, Rfl: 0    latanoprost 0.005 % Ophthalmic Solution, Place 1 drop into both eyes nightly. TAKE AT BEDTIME, Disp: , Rfl:     Scopolamine 1.5mg TD patch 1mg/3days, Place 1 patch onto the skin every third day., Disp: 10 patch, Rfl: 0    ondansetron 4 MG Oral Tablet Dispersible, Take 1 tablet (4 mg total) by mouth every 8 (eight) hours as needed for Nausea., Disp: 30 tablet, Rfl: 0    NIFEdipine ER (PROCARDIA XL) 30 MG Oral Tablet 24 Hr, Take 1 tablet (30 mg total) by mouth daily. (Patient not taking: Reported on 9/25/2024), Disp: 30 tablet, Rfl: 3    losartan 25 MG Oral Tab, Take 1 tablet (25 mg total) by mouth daily., Disp: , Rfl:     metoprolol tartrate 25 MG Oral Tab, Take 1 tablet (25 mg total)  by mouth 2 (two) times daily., Disp: , Rfl:     aspirin (ASPIRIN 81) 81 MG Oral Tab EC, Take 1 tablet (81 mg total) by mouth daily., Disp: , Rfl: 0      Review of Systems  A 10 point review of systems was performed and negative unless otherwise documented per HPI.    Physical Findings   /79   Pulse 60   Temp 97.7 °F (36.5 °C) (Temporal)   Resp 18   SpO2 97%   Physical Exam  Vitals and nursing note reviewed. Exam conducted with a chaperone present.   Constitutional:       General: He is not in acute distress.  HENT:      Head: Normocephalic and atraumatic.      Mouth/Throat:      Mouth: Mucous membranes are moist.   Cardiovascular:      Rate and Rhythm: Normal rate and regular rhythm.   Pulmonary:      Effort: Pulmonary effort is normal.   Abdominal:      General: There is no distension.      Palpations: Abdomen is soft.      Tenderness: There is no abdominal tenderness.   Musculoskeletal:         General: No deformity.   Skin:     General: Skin is warm and dry.   Neurological:      General: No focal deficit present.      Mental Status: He is alert.   Psychiatric:         Mood and Affect: Mood normal.             Assessment   1. Encounter for screening colonoscopy        This is a very nice 63-year-old gentleman referred to me for a screening colonoscopy.  Patient's most recent colonoscopy was performed in 2012.  Patient does not believe there were any polyps or significant pathology discovered during this colonoscopy.  Patient denies any rectal bleeding, change in bowel habits, anemia or unintentional weight loss.  No family history of colon or rectal cancer.  Patient does take 81 mg of aspirin daily and has a history of cardiac stents.    Plan  I do recommend scheduling a screening colonoscopy.  The details of the procedure were discussed including the prep instructions, risks, benefits and alternatives.  Patient expressed understanding was agreeable to schedule a colonoscopy.  This has been scheduled for  4/11/2025 at [facility] under monitored anesthesia care pending cardiac clearance.  Patient is okay to continue 81 mg of aspirin.     No orders of the defined types were placed in this encounter.      Imaging & Referrals   None    Follow Up  No follow-ups on file.    Boris Vázquez MD

## 2025-03-24 NOTE — TELEPHONE ENCOUNTER
Notification or Prior Authorization is not required for the requested services  This [facility] [employer] member's plan does not currently require a prior authorization for these services. If you have general questions about the prior authorization requirements, please call us at [phone] or visit [employer] > Clinician Resources > Advance and Admission Notification Requirements. The number above acknowledges your notification. Please write this number down for future reference. Notification is not a guarantee of coverage or payment.  Decision ID #: [id number]  The number above acknowledges your inquiry and our response. Please write this number down and refer to it for future inquiries. Coverage and payment for an item or service is governed by the member's benefit plan document, and, if applicable, the provider's participation agreement with the Health Plan.  Patient details    keyboard_arrow_up  Patient name  KALYAN LOVE  Member number  [id number]  Group number  [id number]  Product  POS  Relationship  Spouse  Effective date  02/01/2024  Termination date  12/31/9999  Insurance type  Commercial  Verbal language preference  -  Written language preference  -  info  A future timeline may be available for this member. For future coverage please call the telephone number located on the back of the member's Medical ID card.  Admitting/attending physician details    keyboard_arrow_up  Name  Boris Vázquez  Tax ID number  [id number]  Address  [address] [place] [address]  Status  In network  Service details    keyboard_arrow_up  Place of service  [facility]  What is place of service?  Service details  Surgical  Facility details    keyboard_arrow_up  Name  [facility]  Tax ID number  [id number]  Address  [address], [place] [address] [phone]  Status  In network  Facility service dates details    keyboard_arrow_up  Start  date  04/08/2025  End date  07/07/2025

## 2025-04-08 NOTE — OPERATIVE REPORT
[facility]  Operative Note    Bhavesh Anderson Location: OR   CSN [id number] MRN [id number]    1960 Age 64 year old   Admission Date 2025 Operation Date 2025   Attending Physician Boris Vázquez MD Operating Physician Boris Vázquez MD   PCP Campbell Giles MD          Patient Name: Bhavesh Anderson    Preoperative Diagnosis: Encounter for screening colonoscopy [Z12.11]    Postoperative Diagnosis:   Colon polyps  Diverticulosis  Hypertrophied anal papilla    Primary Surgeon: Boris Vázquez MD    Anesthesia: MAC    Procedures: Colonoscopy to the cecum with cold snare and biopsy forceps polypectomies    Specimen:   Cecal polyp  Ascending colon polyp    Estimated Blood Loss: 1 cc    Complications: None immediate    Condition: Good    Indications for Surgery:   This is a very nice 64-year-old gentleman referred to me for a screening colonoscopy.  Patient's most recent colonoscopy was performed in .  Patient does not believe there were any polyps or significant pathology discovered during this colonoscopy.  Patient denies any rectal bleeding, change in bowel habits, anemia or unintentional weight loss.  No family history of colon or rectal cancer.  Patient does take 81 mg of aspirin daily and has a history of cardiac stents.     I do recommend scheduling a screening colonoscopy.  The details of the procedure were discussed including the prep instructions, risks, benefits and alternatives.  Patient expressed understanding was agreeable to schedule a colonoscopy.  This has been scheduled for 2025 at [facility] under monitored anesthesia care pending cardiac clearance.  Patient is okay to continue 81 mg of aspirin.    Patient presents for the colonoscopy procedure today.  He received preoperative cardiac clearance.  He completed the bowel prep as instructed.  Consent was signed.  All questions answered.    Surgical Findings:   The quality of the bowel prep was good.  There  was a 3 mm semipedunculated polyp in the cecum adjacent to the appendiceal orifice.  There was a 5 mm semipedunculated polyp in the ascending colon.  There was diverticulosis with single diverticulum visualized in the ascending and transverse colon and few visualized in the descending and sigmoid.  There was a hypertrophied anal papilla.    Description of Procedure:   The patient was taken to the endoscopy suite and positioned in the left lateral decubitus position with knees flexed. Monitored anesthesia care was administered. A time-out was performed.    The perineum and perianal skin were examined. A digital rectal examination was performed. These were both normal. A well-lubricated adult colonoscope was then inserted and carefully navigated to the cecum. CO2 insufflation was used throughout the procedure. Advancement was accomplished with ease. The appendiceal orifice and ileocecal valve were identified and photographed. The terminal ileum was intubated. The scope was then withdrawn as the mucosa was circumferentially examined.     There was a 3 mm semipedunculated polyp in the cecum adjacent to the appendiceal orifice.  This was removed and retrieved using cold biopsy forceps.  The polypectomy site was hemostatic.  There was a 5 mm semipedunculated polyp in the ascending colon.  This was removed using a cold snare and retrieved.  The polypectomy site was hemostatic.  There was diverticulosis with single diverticulum visualized in the ascending and transverse colon and few visualized in the descending and sigmoid.  There was a hypertrophied anal papilla. In the rectum, the scope was retroflexed to evaluate the anorectal junction.  The scope was straightened and excess gas was suctioned from the colon and the scope removed, terminating the procedure.    Anesthesia was terminated and the patient transported to the recovery unit in good condition. The patient tolerated the procedure well without apparent  intraoperative complication.    Follow up:   Patient will need next colonoscopy in 7 years if polyps are adenomatous, 10 years if polyps are benign.       Boris Vázquez MD  4/8/2025  8:54 AM

## 2025-04-08 NOTE — H&P
New Patient Visit Note       Active Problems      1. Encounter for screening colonoscopy        Chief Complaint   No chief complaint on file.      History of Present Illness   This is a very nice 64-year-old gentleman referred to me for a screening colonoscopy.  Patient's most recent colonoscopy was performed in 2012.  Patient does not believe there were any polyps or significant pathology discovered during this colonoscopy.  Patient denies any rectal bleeding, change in bowel habits, anemia or unintentional weight loss.  No family history of colon or rectal cancer.  Patient does take 81 mg of aspirin daily and has a history of cardiac stents.      Allergies  Bhavesh has No Known Allergies.    Past Medical / Surgical / Social / Family History    The past medical and past surgical history have been reviewed by me today.    Past Medical History:    Anxiety state    Atherosclerosis of coronary artery    Back problem    Cancer (HCC)    melanoma    Cataract    Cervical spondylosis    Diverticulosis    Duodenitis    Erosive    Epigastric pain    Gastric diverticulum    Gastritis    Glaucoma    Heart attack (HCC)    Heterozygous MTHFR mutation C677T    High blood pressure    High cholesterol    HTN (hypertension)    Hx of motion sickness    Hyperlipidemia    Hypogonadism male    Insomnia    Irritable bowel syndrome    Lumbago    Myocardial infarction (HCC)    3 stents    Screening for iron deficiency anemia    Screening for lipoid disorders    Screening for other and unspecified endocrine, nutritional, metabolic, and immunity disorders    Metabolic Disorders    Screening for thyroid disorder    Special screening for malignant neoplasm of prostate    Thoracic disc herniation    Visual impairment     Past Surgical History:   Procedure Laterality Date    Cataract      Colonoscopy  2015    Fluor gid & loclzj ndl/cath spi dx/ther njx  07/09/2012    Procedure: THORACIC EPIDURAL;  Surgeon: Heron Ramirez MD;  Location: [facility]  FOR PAIN MANAGEMENT    Fluor gid & loclzj ndl/cath spi dx/ther njx  07/24/2012    Procedure: TRANSFORAMINAL EPIDURAL - CERVICAL;  Surgeon: Khai Iniguez MD;  Location: [facility]    Fluor gid & loclzj ndl/cath spi dx/ther njx  10/23/2014    Procedure: ;  Surgeon: Khai Iniguez MD;  Location: [facility]    Fluor gid & loclzj ndl/cath spi dx/ther njx N/A 11/03/2014    Procedure: CERVICAL EPIDURAL;  Surgeon: Khai Iniguez MD;  Location: [facility]    Fluor gid & loclzj ndl/cath spi dx/ther njx N/A 11/24/2014    Procedure: CERVICAL EPIDURAL;  Surgeon: Khai Iniguez MD;  Location: [facility]    Fluor gid & loclzj ndl/cath spi dx/ther njx N/A 02/27/2015    Procedure: THORACIC EPIDURAL;  Surgeon: Khai Iniguez MD;  Location: [facility]    Fluor gid & loclzj ndl/cath spi dx/ther njx N/A 03/13/2015    Procedure: THORACIC EPIDURAL;  Surgeon: Heron Ramirez MD;  Location: [facility]    Fluor gid & loclzj ndl/cath spi dx/ther njx N/A 03/27/2015    Procedure: THORACIC EPIDURAL;  Surgeon: Khai Iniguez MD;  Location: [facility]    Injection, w/wo contrast, dx/therapeutic substance, epidural/subarachnoid; cervical/thoracic  07/09/2012    Procedure: THORACIC EPIDURAL;  Surgeon: Heron Ramirez MD;  Location: [facility]    Injection, w/wo contrast, dx/therapeutic substance, epidural/subarachnoid; cervical/thoracic  07/24/2012    Procedure: TRANSFORAMINAL EPIDURAL - CERVICAL;  Surgeon: Khai Iniguez MD;  Location: [facility]    Injection, w/wo contrast, dx/therapeutic substance, epidural/subarachnoid; cervical/thoracic N/A 10/23/2014    Procedure: CERVICAL EPIDURAL;  Surgeon: Khai Iniguez MD;  Location: [facility]    Injection, w/wo contrast, dx/therapeutic substance, epidural/subarachnoid; cervical/thoracic N/A  11/03/2014    Procedure: CERVICAL EPIDURAL;  Surgeon: Khai Iniguez MD;  [facility]    Injection, w/wo contrast, dx/therapeutic substance, epidural/subarachnoid; cervical/thoracic N/A 11/24/2014    Procedure: CERVICAL EPIDURAL;  Surgeon: Khai Iniguez MD;  [facility]    Injection, w/wo contrast, dx/therapeutic substance, epidural/subarachnoid; cervical/thoracic N/A 02/27/2015    Procedure: THORACIC EPIDURAL;  Surgeon: Khai Iniguez MD;  [facility]    Injection, w/wo contrast, dx/therapeutic substance, epidural/subarachnoid; cervical/thoracic N/A 03/13/2015    Procedure: THORACIC EPIDURAL;  Surgeon: Heron Ramirez MD;  [place] [facility]    Injection, w/wo contrast, dx/therapeutic substance, epidural/subarachnoid; cervical/thoracic N/A 03/27/2015    Procedure: THORACIC EPIDURAL;  Surgeon: Khai Iniguez MD;  [place]    M-sedaj by  phys perfrmg svc 5+ yr  07/09/2012    Procedure: THORACIC EPIDURAL;  Surgeon: Heron Ramirez MD;  [facility]: [facility]    M-sedaj by  phys perfrmg svc 5+ yr  10/23/2014    Procedure: ;  Surgeon: Khai Iniguez MD;  [facility]: [facility]    M-sedaj by  phys perfrmg svc 5+ yr N/A 11/03/2014    Procedure: CERVICAL EPIDURAL;  Surgeon: Khai Iniguez MD;  Location: [facility]    M-sedaj by  phys perfrmg svc 5+ yr N/A 11/24/2014    Procedure: CERVICAL EPIDURAL;  Surgeon: Khai Iniguez MD;  Location: [facility]    M-sedaj by  phys perfrmg svc 5+ yr N/A 02/27/2015    Procedure: THORACIC EPIDURAL;  Surgeon: Khai Iniguez MD;  Location: [facility]    M-sedaj by  phys perfrmg svc 5+ yr N/A 03/13/2015    Procedure: THORACIC EPIDURAL;  Surgeon: Heron Ramirez MD;  [facility]: [facility]    M-sedaj by moni mora  perfrmg c 5+ yr N/A 03/27/2015    Procedure: THORACIC EPIDURAL;  Surgeon: Khai Iniguez MD;  [place] [facility]    Upper gi endoscopy,biopsy  05/10/2012       The family history and social history have been reviewed by me today.    Family History   Problem Relation Age of Onset    Other (Diabetes Mellitus[other]) Mother     Cancer Mother         Lung cancer    Cancer Father         Lung cancer    Heart Disease Father     Other (Gastric ca[other]) Other         fam hx     Social History     Socioeconomic History    Marital status:    Tobacco Use    Smoking status: Never    Smokeless tobacco: Never   Vaping Use    Vaping status: Never Used   Substance and Sexual Activity    Alcohol use: Yes     Alcohol/week: 6.0 standard drinks of alcohol     Types: 6 Cans of beer per week     Comment: social    Drug use: No   Other Topics Concern    Caffeine Concern Yes    Stress Concern No    Weight Concern No    Special Diet No    Exercise Yes    Seat Belt Yes      No current outpatient medications on file.      Review of Systems  A 10 point review of systems was performed and negative unless otherwise documented per HPI.    Physical Findings   Ht 73\"   Wt 230 lb (104.3 kg)   BMI 30.34 kg/m²   Physical Exam  Vitals and nursing note reviewed. Exam conducted with a chaperone present.   Constitutional:       General: He is not in acute distress.  HENT:      Head: Normocephalic and atraumatic.      Mouth/Throat:      Mouth: Mucous membranes are moist.   Cardiovascular:      Rate and Rhythm: Normal rate and regular rhythm.   Pulmonary:      Effort: Pulmonary effort is normal.   Abdominal:      General: There is no distension.      Palpations: Abdomen is soft.      Tenderness: There is no abdominal tenderness.   Musculoskeletal:         General: No deformity.   Skin:     General: Skin is warm and dry.   Neurological:      General: No focal deficit present.      Mental Status: He is alert.   Psychiatric:          Mood and Affect: Mood normal.             Assessment   1. Encounter for screening colonoscopy        This is a very nice 64-year-old gentleman referred to me for a screening colonoscopy.  Patient's most recent colonoscopy was performed in 2012.  Patient does not believe there were any polyps or significant pathology discovered during this colonoscopy.  Patient denies any rectal bleeding, change in bowel habits, anemia or unintentional weight loss.  No family history of colon or rectal cancer.  Patient does take 81 mg of aspirin daily and has a history of cardiac stents.    Plan  I do recommend scheduling a screening colonoscopy.  The details of the procedure were discussed including the prep instructions, risks, benefits and alternatives.  Patient expressed understanding was agreeable to schedule a colonoscopy.  This has been scheduled for 4/8/2025 at [facility] under monitored anesthesia care pending cardiac clearance.  Patient is okay to continue 81 mg of aspirin.     No orders of the defined types were placed in this encounter.      Imaging & Referrals   VITAL SIGNS  PLACE PIV  ACTIVITY AS TOLERATED  INITIATE ALGORITHM FOR HYPOGLYCEMIA FOR ADULTS (NON-PREG)  NOTIFY PHYSICIAN (SPECIFY)  NOTIFY PHYSICIAN (SPECIFY)  NOTIFY PHYSICIAN (SPECIFY)  VERIFY INFORMED CONSENT  NPO  VITAL SIGNS - NOTIFY PHYSICIAN  VITAL SIGNS  NOTIFY PHYSICIAN (SPECIFY)  DISCHARGE WHEN CRITERIA MET  DISCONTINUE PIV    Follow Up  No follow-ups on file.    Boris Vázquez MD

## 2025-04-08 NOTE — DISCHARGE INSTRUCTIONS
Home Care Instructions for Colonoscopy with Sedation    Diet:  - Resume your regular diet as tolerated unless otherwise instructed.  - Start with light meals to minimize bloating.  - Do not drink alcohol today.    Medication:  - If you have questions about resuming your normal medications, please contact your Primary Care Physician.    Activities:  - Take it easy today. Do not return to work today.  - Do not drive today.  - Do not operate any machinery today (including kitchen equipment).    Colonoscopy:  - You may notice some rectal \"spotting\" (a little blood on the toilet tissue) for a day or two after the exam. This is normal.  - If you experience any rectal bleeding (not spotting), persistent tenderness or sharp severe abdominal pains, oral temperature over 100 degrees Fahrenheit, light-headedness or dizziness, or any other problems, contact your doctor.    **If unable to reach your doctor, please go to the [facility] Emergency Room**    - Your referring physician will receive a full report of your examination.  - If you do not hear from your doctor's office within two weeks of your biopsy, please call them for your results.    You may be able to see your laboratory results in [employer] between 4 and 7 business days.  In some cases, your physician may not have viewed the results before they are released to [employer].  If you have questions regarding your results contact the physician who ordered the test/exam by phone or via [employer] by choosing \"Ask a Medical Question.\"

## 2025-04-08 NOTE — ANESTHESIA PREPROCEDURE EVALUATION
PRE-OP EVALUATION    Patient Name: Bhavesh Anderson    Admit Diagnosis: Encounter for screening colonoscopy [Z12.11]    Pre-op Diagnosis: Encounter for screening colonoscopy [Z12.11]    COLONOSCOPY    Anesthesia Procedure: COLONOSCOPY    Surgeons and Role:     * Boris Vázquez MD - Primary    Pre-op vitals reviewed.        Body mass index is 30.34 kg/m².    Current medications reviewed.  Hospital Medications:   lactated ringers infusion   Intravenous Continuous       Outpatient Medications:   Prescriptions Prior to Admission[1]    Allergies: Patient has no known allergies.      Anesthesia Evaluation    Patient summary reviewed.    Anesthetic Complications  (-) history of anesthetic complications         GI/Hepatic/Renal    Negative GI/hepatic/renal ROS.                             Cardiovascular    Negative cardiovascular ROS.    Exercise tolerance: good     MET: >4      (+) hypertension                                     Endo/Other    Negative endo/other ROS.                              Pulmonary    Negative pulmonary ROS.                       Neuro/Psych    Negative neuro/psych ROS.                                  Past Surgical History:   Procedure Laterality Date    Cataract      Colonoscopy  2015    Fluor gid & loclzj ndl/cath spi dx/ther njx  07/09/2012    Procedure: THORACIC EPIDURAL;  Surgeon: Heron Ramirez MD;  Location: [facility]    Fluor gid & loclzj ndl/cath spi dx/ther njx  07/24/2012    Procedure: TRANSFORAMINAL EPIDURAL - CERVICAL;  Surgeon: Khai Iniguez MD;  Location: [facility]    Fluor gid & loclzj ndl/cath spi dx/ther njx  10/23/2014    Procedure: ;  Surgeon: Khai Iniguez MD;  Location: [facility]    Fluor gid & loclzj ndl/cath spi dx/ther njx N/A 11/03/2014    Procedure: CERVICAL EPIDURAL;  Surgeon: Khai Iniguez MD;  [place] [facility]    Fluor gid & loclzj ndl/cath spi dx/ther njx N/A  11/24/2014    Procedure: CERVICAL EPIDURAL;  Surgeon: Khai Iniguez MD;  [facility]    Fluor gid & loclzj ndl/cath spi dx/ther njx N/A 02/27/2015    Procedure: THORACIC EPIDURAL;  Surgeon: Khai Iniguez MD;  [facility]    Fluor gid & loclzj ndl/cath spi dx/ther njx N/A 03/13/2015    Procedure: THORACIC EPIDURAL;  Surgeon: Heron Ramirez MD;  [facility]    Fluor gid & loclzj ndl/cath spi dx/ther njx N/A 03/27/2015    Procedure: THORACIC EPIDURAL;  Surgeon: Khai Iniguez MD;  Location: [facility]    Injection, w/wo contrast, dx/therapeutic substance, epidural/subarachnoid; cervical/thoracic  07/09/2012    Procedure: THORACIC EPIDURAL;  Surgeon: Heron Ramirez MD;  Location: [facility]    Injection, w/wo contrast, dx/therapeutic substance, epidural/subarachnoid; cervical/thoracic  07/24/2012    Procedure: TRANSFORAMINAL EPIDURAL - CERVICAL;  Surgeon: Khai Iniguez MD;  Location: [facility]    Injection, w/wo contrast, dx/therapeutic substance, epidural/subarachnoid; cervical/thoracic N/A 10/23/2014    Procedure: CERVICAL EPIDURAL;  Surgeon: Khai Iniguez MD;  Location: [facility]    Injection, w/wo contrast, dx/therapeutic substance, epidural/subarachnoid; cervical/thoracic N/A 11/03/2014    Procedure: CERVICAL EPIDURAL;  Surgeon: Khai Iniguez MD;  Location: [facility]    Injection, w/wo contrast, dx/therapeutic substance, epidural/subarachnoid; cervical/thoracic N/A 11/24/2014    Procedure: CERVICAL EPIDURAL;  Surgeon: Khai Iniguez MD;  [facility]    Injection, w/wo contrast, dx/therapeutic substance, epidural/subarachnoid; cervical/thoracic N/A 02/27/2015    Procedure: THORACIC EPIDURAL;  Surgeon: Khai Iniguez MD;  Location: [facility]    Injection, w/wo  contrast, dx/therapeutic substance, epidural/subarachnoid; cervical/thoracic N/A 03/13/2015    Procedure: THORACIC EPIDURAL;  Surgeon: Heron Ramirez MD;  [place] [facility]    Injection, w/wo contrast, dx/therapeutic substance, epidural/subarachnoid; cervical/thoracic N/A 03/27/2015    Procedure: THORACIC EPIDURAL;  Surgeon: Khai Iniguez MD;  [place] [facility]    M-sedaj by  phys perfrmg svc 5+ yr  07/09/2012    Procedure: THORACIC EPIDURAL;  Surgeon: Heron Ramirez MD;  [place] [facility]    M-sedaj by  phys perfrmg svc 5+ yr  10/23/2014    Procedure: ;  Surgeon: Khai Iniguez MD;  Location: [facility] PAIN MANAGEMENT    M-sedaj by  phys perfrmg svc 5+ yr N/A 11/03/2014    Procedure: CERVICAL EPIDURAL;  Surgeon: Khai Iniguez MD;  Location: [facility]    M-sedaj by  phys perfrmg svc 5+ yr N/A 11/24/2014    Procedure: CERVICAL EPIDURAL;  Surgeon: Khai Iniguez MD;  [facility]: [facility]    M-sedaj by  phys perfrmg svc 5+ yr N/A 02/27/2015    Procedure: THORACIC EPIDURAL;  Surgeon: Khai Iniguez MD;  Location: [facility]    M-sedaj by  phys perfrmg svc 5+ yr N/A 03/13/2015    Procedure: THORACIC EPIDURAL;  Surgeon: Heron Ramirez MD;  Location: [facility]    M-sedaj by  phys perfrmg svc 5+ yr N/A 03/27/2015    Procedure: THORACIC EPIDURAL;  Surgeon: Khai Iniguez MD;  Location: [facility] PAIN [facility]    Upper gi endoscopy,biopsy  05/10/2012     Social History     Socioeconomic History    Marital status:    Tobacco Use    Smoking status: Never    Smokeless tobacco: Never   Vaping Use    Vaping status: Never Used   Substance and Sexual Activity    Alcohol use: Yes     Alcohol/week: 6.0 standard drinks of alcohol     Types: 6 Cans of beer per week     Comment: social    Drug use: No   Other Topics Concern    Caffeine  Concern Yes    Stress Concern No    Weight Concern No    Special Diet No    Exercise Yes    Seat Belt Yes     History   Drug Use No     Available pre-op labs reviewed.               Airway      Mallampati: II  Mouth opening: 3 FB  TM distance: 4 - 6 cm  Neck ROM: full Cardiovascular    Cardiovascular exam normal.  Rhythm: regular  Rate: normal  (-) murmur   Dental             Pulmonary    Pulmonary exam normal.  Breath sounds clear to auscultation bilaterally.               Other findings              ASA: 2   Plan: MAC  NPO status verified and patient meets guidelines.    Post-procedure pain management plan discussed with surgeon and patient.    Comment: Discussed MAC anesthesia with patient.  Discussed risks including but not limited to perioperative awareness, conversion to general anesthesia and risks associated with GA.  PT understands and agrees to proceed.    Plan/risks discussed with: patient                Present on Admission:  **None**             [1]   Medications Prior to Admission   Medication Sig Dispense Refill Last Dose/Taking    Co-Enzyme Q10 100 MG Oral Cap Take by mouth.   Taking    Omega-3 Fatty Acids (FISH OIL OR) Take by mouth.   Taking    clonazePAM 0.5 MG Oral Tab Take 0.5 tablets (0.25 mg total) by mouth 2 (two) times daily as needed for Anxiety (or insomnia). 30 tablet 0 Taking As Needed    atorvastatin 40 MG Oral Tab Take 1 tablet (40 mg total) by mouth daily.   Taking    PEG 3350-KCl-Na Bicarb-NaCl (TRILYTE) 420 g Oral Recon Soln Starting at 4:00 pm the night before procedure, drink 8 ounces of the prep every 15-20 minutes until finished 1 each 0 Taking    latanoprost 0.005 % Ophthalmic Solution Place 1 drop into both eyes nightly. TAKE AT BEDTIME   Taking    losartan 25 MG Oral Tab Take 1 tablet (25 mg total) by mouth daily.   Taking    metoprolol tartrate 25 MG Oral Tab Take 1 tablet (25 mg total) by mouth 2 (two) times daily.   Taking    aspirin (ASPIRIN 81) 81 MG Oral Tab EC Take 1  tablet (81 mg total) by mouth daily.  0 Taking

## 2025-04-08 NOTE — ANESTHESIA POSTPROCEDURE EVALUATION
[facility]    [facility]:  Hospital Outpatient [facility]   Age/Gender 64 year old male MRN [id number]   Location [facility] Attending Boris Vázquez MD   Hosp Day # 0 PCP Campbell Giles MD       Anesthesia Post-op Note    COLONOSCOPY with forcep and cold snare polypectomy    Procedure Summary       Date: 04/08/25 Room / Location:  ENDOSCOPY 03 /  ENDOSCOPY    Anesthesia Start: 0812 Anesthesia Stop: 0854    Procedure: COLONOSCOPY with forcep and cold snare polypectomy Diagnosis:       Encounter for screening colonoscopy      (Polyps, diverticulosis)    Surgeons: Boris Vázquez MD Anesthesiologist: Arvin Palacios MD    Anesthesia Type: MAC ASA Status: 2            Anesthesia Type: MAC    Vitals Value Taken Time   /63 04/08/25 0855   Temp 98 04/08/25 0855   Pulse 55 04/08/25 0855   Resp 16 04/08/25 0855   SpO2 97 % 04/08/25 0855           Patient Location: Endoscopy    Anesthesia Type: MAC    Airway Patency: patent and extubated    Postop Pain Control: adequate    Mental Status: mildly sedated but able to meaningfully participate in the post-anesthesia evaluation    Nausea/Vomiting: none    Cardiopulmonary/Hydration status: stable euvolemic    Complications: no apparent anesthesia related complications    Postop vital signs: stable    Dental Exam: Unchanged from Preop    Patient to be discharged from PACU when criteria met.

## 2025-04-09 NOTE — TELEPHONE ENCOUNTER
----- Message from Boris Vázquez sent at 4/9/2025  2:48 PM CDT -----  Pathology from the cecal polyp returned as a hyperplastic type polyp.  This is a completely benign type of polyp that does not increase risk of colorectal cancer.  Pathology from the ascending colon   polyp returned as a tubular adenoma.  This is a low-risk precancerous polyp which was completely removed.  I recommend repeating a colonoscopy in 7 years time.  ------------------------    Recall placed, and Care Gaps updated.

## (undated) NOTE — MR AVS SNAPSHOT
[facility] [address]  [address] [place] [facility] [place]  [phone]               Thank you for choosing us for your health care visit with Rose Verdin MD.  We are glad to serve you and happy to provide you with t Immunizations Administered in the Office Today     INFLUENZA  Deferred (+Patient Refuses Z28.21)      Joe                  Visit [facility] online at  [email or web address]

## (undated) NOTE — LETTER
Surgical Clearance Needed    Date: 11/7/2024                                                                       From: MARKOS    Attn: DR BELLAMY                                                                Fax:     RE: Surgical Clearance               # of Pages: 1        Patient Name: Bhavesh Anderson    Patient YOB: 1960    Consult For: CARDIAC CLEARANCE - OKAY TO CONTINUE 81 MG ASA UNTIL COLONOSCOPY     Surgery: COLONOSCOPY     Date of Surgery: 4/8/25 AT [facility]         This facsimile transmission is provided for your internal use only. If the reader of this message is not the intended recipient, you are hereby notified that you have received this document in error, and that any review, dissemination, distribution, or copying of this message is strictly prohibited. If you have received this communication in error, please notify us immediately by telephone and return the original message to us by mail.